# Patient Record
Sex: FEMALE | Race: WHITE | NOT HISPANIC OR LATINO | Employment: UNEMPLOYED | URBAN - METROPOLITAN AREA
[De-identification: names, ages, dates, MRNs, and addresses within clinical notes are randomized per-mention and may not be internally consistent; named-entity substitution may affect disease eponyms.]

---

## 2017-01-17 ENCOUNTER — OFFICE VISIT (OUTPATIENT)
Dept: OBSTETRICS AND GYNECOLOGY | Facility: CLINIC | Age: 37
End: 2017-01-17
Attending: OBSTETRICS & GYNECOLOGY
Payer: COMMERCIAL

## 2017-01-17 ENCOUNTER — LAB VISIT (OUTPATIENT)
Dept: LAB | Facility: OTHER | Age: 37
End: 2017-01-17
Attending: OBSTETRICS & GYNECOLOGY
Payer: COMMERCIAL

## 2017-01-17 VITALS
SYSTOLIC BLOOD PRESSURE: 110 MMHG | WEIGHT: 130.06 LBS | BODY MASS INDEX: 23.04 KG/M2 | HEIGHT: 63 IN | DIASTOLIC BLOOD PRESSURE: 68 MMHG

## 2017-01-17 DIAGNOSIS — N94.6 MENORRHALGIA: ICD-10-CM

## 2017-01-17 DIAGNOSIS — Z01.419 ENCOUNTER FOR GYNECOLOGICAL EXAMINATION: ICD-10-CM

## 2017-01-17 DIAGNOSIS — Z11.51 SCREENING FOR HPV (HUMAN PAPILLOMAVIRUS): ICD-10-CM

## 2017-01-17 DIAGNOSIS — Z12.4 PAP SMEAR FOR CERVICAL CANCER SCREENING: Primary | ICD-10-CM

## 2017-01-17 DIAGNOSIS — N92.6 IRREGULAR PERIODS/MENSTRUAL CYCLES: ICD-10-CM

## 2017-01-17 LAB
BASOPHILS # BLD AUTO: 0.02 K/UL
BASOPHILS NFR BLD: 0.4 %
DIFFERENTIAL METHOD: ABNORMAL
EOSINOPHIL # BLD AUTO: 0.2 K/UL
EOSINOPHIL NFR BLD: 3.9 %
ERYTHROCYTE [DISTWIDTH] IN BLOOD BY AUTOMATED COUNT: 12.7 %
FSH SERPL-ACNC: 4.4 MIU/ML
HCT VFR BLD AUTO: 39.7 %
HGB BLD-MCNC: 13.4 G/DL
LYMPHOCYTES # BLD AUTO: 2 K/UL
LYMPHOCYTES NFR BLD: 39 %
MCH RBC QN AUTO: 29.6 PG
MCHC RBC AUTO-ENTMCNC: 33.8 %
MCV RBC AUTO: 88 FL
MONOCYTES # BLD AUTO: 0.3 K/UL
MONOCYTES NFR BLD: 5.5 %
NEUTROPHILS # BLD AUTO: 2.6 K/UL
NEUTROPHILS NFR BLD: 51.2 %
PLATELET # BLD AUTO: 141 K/UL
PMV BLD AUTO: 11.8 FL
RBC # BLD AUTO: 4.53 M/UL
TSH SERPL DL<=0.005 MIU/L-ACNC: 1.52 UIU/ML
WBC # BLD AUTO: 5.13 K/UL

## 2017-01-17 PROCEDURE — 83001 ASSAY OF GONADOTROPIN (FSH): CPT

## 2017-01-17 PROCEDURE — 88175 CYTOPATH C/V AUTO FLUID REDO: CPT

## 2017-01-17 PROCEDURE — 99999 PR PBB SHADOW E&M-NEW PATIENT-LVL III: CPT | Mod: PBBFAC,,, | Performed by: OBSTETRICS & GYNECOLOGY

## 2017-01-17 PROCEDURE — 85025 COMPLETE CBC W/AUTO DIFF WBC: CPT

## 2017-01-17 PROCEDURE — 36415 COLL VENOUS BLD VENIPUNCTURE: CPT

## 2017-01-17 PROCEDURE — 84443 ASSAY THYROID STIM HORMONE: CPT

## 2017-01-17 PROCEDURE — 99385 PREV VISIT NEW AGE 18-39: CPT | Mod: S$GLB,,, | Performed by: OBSTETRICS & GYNECOLOGY

## 2017-01-17 PROCEDURE — 87624 HPV HI-RISK TYP POOLED RSLT: CPT

## 2017-01-17 NOTE — MR AVS SNAPSHOT
[facility]  [address]  [address]  [facility] [address]  Phone: [phone]  Fax: [phone]                  Rakan Castro   2017 11:00 AM   Office Visit    Description:  Female : 1980   Provider:  Michael Schofield MD   Department:  [facility]           Reason for Visit     Well Woman           Diagnoses this Visit        Comments    Pap smear for cervical cancer screening    -  Primary     Screening for HPV (human papillomavirus)         Encounter for gynecological examination         Menorrhalgia         Irregular periods/menstrual cycles                To Do List           Future Appointments        Provider Department Dept Phone    2017 12:45 PM LAB, [facility] [phone]    2017 9:00 AM [facility], WOMEN'S ULTRASOUND [facility] - Ultrasound [phone]    2017 9:30 AM Michael Schofield MD [facility] [phone]      Goals (5 Years of Data)     None      Follow-Up and Disposition     Return in about 1 year (around 2018).       These Medications        Disp Refills Start End    norethindrone-ethinyl estradiol (OVCON) 0.4-35 mg-mcg per tablet 28 tablet 11 2017    Take 1 tablet by mouth once daily. - Oral    Pharmacy: [facility] - [address] #: [phone]         [facility] On Call     [facility] On Call Nurse Care Line -  Assistance  Registered nurses in the [facility] provide clinical advisement, health education, appointment booking, and other advisory services.  Call for this free service at [phone].             Medications           Message regarding Medications     Verify the changes and/or additions to your medication regime listed below are the same as discussed with your clinician today.  If any of these changes or additions are incorrect, please notify your healthcare provider.        START taking these  "NEW medications        Refills    norethindrone-ethinyl estradiol (OVCON) 0.4-35 mg-mcg per tablet 11    Sig: Take 1 tablet by mouth once daily.    Class: Normal    Route: Oral           Verify that the below list of medications is an accurate representation of the medications you are currently taking.  If none reported, the list may be blank. If incorrect, please contact your healthcare provider. Carry this list with you in case of emergency.           Current Medications     norethindrone-ethinyl estradiol (OVCON) 0.4-35 mg-mcg per tablet Take 1 tablet by mouth once daily.           Clinical Reference Information           Vital Signs - Last Recorded  Most recent update: 1/17/2017 11:37 AM by Liv Bravo MA    BP Ht Wt LMP BMI    110/68 5' 3" (1.6 m) 59 kg (130 lb 1.1 oz) 12/31/2016 23.04 kg/m2      Blood Pressure          Most Recent Value    BP  110/68      Allergies as of 1/17/2017     Sulfa (Sulfonamide Antibiotics)      Immunizations Administered on Date of Encounter - 1/17/2017     None      Orders Placed During Today's Visit      Normal Orders This Visit    HPV DNA probe, amplified     Liquid-based pap smear, screening     Future Labs/Procedures Expected by Expires    CBC auto differential  1/17/2017 3/18/2018    TSH  1/17/2017 3/18/2018    US Pelvis Comp with Transvag NON-OB (xpd  1/17/2017 1/17/2018    Follicle stimulating hormone  As directed 3/18/2018      "

## 2017-01-17 NOTE — PROGRESS NOTES
"CC: Well woman exam    Rakan Castro is a 36 y.o. female  presents for a well woman exam.  She is established.  LMP: Patient's last menstrual period was 2016..      There are no preventive care reminders to display for this patient.  Annual Exam, . Last pap  negative. Last mammo - [facility] neg  Her  had a vasectomy.    Last menstrual period 16.  Cycles are regular lasting 4-5 days.  However for the last 6 months after her period finishes she stops bleeding for 5 days and then she starts up with a whole second.    Past Medical History   Diagnosis Date    Anxiety     Thrombocytopenia        Past Surgical History   Procedure Laterality Date     section       x 3       OB History    Para Term  AB SAB TAB Ectopic Multiple Living   3 3 3       3      # Outcome Date GA Lbr Curry/2nd Weight Sex Delivery Anes PTL Lv   3 Term  38w0d  2.892 kg (6 lb 6 oz) M CS-LTranv   Y   2 Term  38w0d  2.58 kg (5 lb 11 oz) M CS-LTranv   Y   1 Term  39w0d  2.466 kg (5 lb 7 oz) M CS-LTranv   Y      Complications: Oligohydramnios          Family History   Problem Relation Age of Onset    No Known Problems Father     No Known Problems Mother     No Known Problems Brother     Breast cancer Maternal Aunt     Diabetes Neg Hx     Colon cancer Neg Hx        Social History   Substance Use Topics    Smoking status: Never Smoker    Smokeless tobacco: None    Alcohol use Yes       Visit Vitals    /68    Ht 5' 3" (1.6 m)    Wt 59 kg (130 lb 1.1 oz)    LMP 2016    BMI 23.04 kg/m2         ROS:  GENERAL: Denies weight gain or weight loss. Feeling well overall.   SKIN: Denies rash or lesions.   HEAD: Denies head injury or headache.   NODES: Denies enlarged lymph nodes.   CHEST: Denies chest pain or shortness of breath.   CARDIOVASCULAR: Denies palpitations or left sided chest pain.   ABDOMEN: No abdominal pain, constipation, diarrhea, nausea, vomiting or " rectal bleeding.   URINARY: No frequency, dysuria, hematuria, or burning on urination.  REPRODUCTIVE: See HPI.   BREASTS: The patient performs breast self-examination and denies pain, lumps, or nipple discharge.   HEMATOLOGIC: No easy bruisability or excessive bleeding.  MUSCULOSKELETAL: Denies joint pain or swelling.   NEUROLOGIC: Denies syncope or weakness.   PSYCHIATRIC: Denies depression, anxiety or mood swings.    Physical Exam:    APPEARANCE: Well nourished, well developed, in no acute distress.  AFFECT: WNL, alert and oriented x 3  SKIN: No acne or hirsutism  ABDOMEN: Soft.  No tenderness or masses.  No hepatosplenomegaly.  No hernias.  BREASTS: Symmetrical, no skin changes or visible lesions.  No palpable masses, nipple discharge bilaterally.  PELVIC: Normal external genitalia without lesions.  Normal hair distribution.  Adequate perineal body, normal urethral meatus.  Vagina moist and well rugated without lesions or discharge.  Cervix pink, without lesions, discharge or tenderness.  No significant cystocele or rectocele.  Bimanual exam shows uterus to be normal size, regular, mobile and nontender.  Adnexa without masses or tenderness.    EXTREMITIES: No edema.    ASSESSMENT AND PLAN  1. Pap smear for cervical cancer screening  Liquid-based pap smear, screening   2. Screening for HPV (human papillomavirus)  HPV DNA probe, amplified   3. Encounter for gynecological examination     4. Menorrhalgia  Follicle stimulating hormone    TSH    CBC auto differential    US Pelvis Comp with Transvag NON-OB (xpd   5. Irregular periods/menstrual cycles   will order ultrasound for next week.  Patient agreeable to a trial of OCPs.  If OCPs regulate her period she will continue.  If OCPs do not regulate her period and she needs an endometrial biopsy.         Patient was counseled today on A.C.S. Pap guidelines and recommendations for yearly pelvic exams, mammograms and monthly self breast exams; to see her PCP for other  health maintenance.     Return in about 1 year (around 1/17/2018).

## 2017-01-24 ENCOUNTER — TELEPHONE (OUTPATIENT)
Dept: OBSTETRICS AND GYNECOLOGY | Facility: CLINIC | Age: 37
End: 2017-01-24

## 2017-01-24 NOTE — TELEPHONE ENCOUNTER
----- Message from Chin Trivedi sent at 1/24/2017 10:12 AM CST -----  Regarding: send out lab called about pts HPV specimen  Aleksandra from the sendout lab at [facility] called about pt's HPV specimen from 1/17. She said the specimen was QNS.

## 2017-01-25 ENCOUNTER — OFFICE VISIT (OUTPATIENT)
Dept: OBSTETRICS AND GYNECOLOGY | Facility: CLINIC | Age: 37
End: 2017-01-25
Payer: COMMERCIAL

## 2017-01-25 VITALS
DIASTOLIC BLOOD PRESSURE: 70 MMHG | HEIGHT: 63 IN | SYSTOLIC BLOOD PRESSURE: 110 MMHG | WEIGHT: 130 LBS | BODY MASS INDEX: 23.04 KG/M2

## 2017-01-25 DIAGNOSIS — N92.0 MENORRHAGIA WITH REGULAR CYCLE: Primary | ICD-10-CM

## 2017-01-25 DIAGNOSIS — D69.6 DISORDER INVOLVING THROMBOCYTOPENIA: ICD-10-CM

## 2017-01-25 PROCEDURE — 99213 OFFICE O/P EST LOW 20 MIN: CPT | Mod: S$GLB,,, | Performed by: OBSTETRICS & GYNECOLOGY

## 2017-01-25 PROCEDURE — 99999 PR PBB SHADOW E&M-EST. PATIENT-LVL II: CPT | Mod: PBBFAC,,, | Performed by: OBSTETRICS & GYNECOLOGY

## 2017-01-25 PROCEDURE — 1159F MED LIST DOCD IN RCRD: CPT | Mod: S$GLB,,, | Performed by: OBSTETRICS & GYNECOLOGY

## 2017-01-25 NOTE — PROGRESS NOTES
Subjective:       Patient ID: Rakan Castro is a 36 y.o. female.    Chief Complaint: here for f/u menorrhagia, labs and u/s    History of Present Illness  36-year-old  3 para 3 with history of menorrhagia here today for follow-up of her lab work as well as an ultrasound.  CBC TSH and FSH were all normal.  Patient has a known history of thrombocytopenia and her platelets are 141.  Ultrasound today her uterus is 7 x 5 x 6 cm with a thickened endometrial stripe of 1.2 cm however patient is due for her period in the next several days.  Right and left ovaries are completely normal.  Patient was prescribed Ovcon for which she will start when she gets her period      GYN & OB History  Patient's last menstrual period was 2016.   Date of Last Pap: 2017    OB History    Para Term  AB SAB TAB Ectopic Multiple Living   3 3 3       3      # Outcome Date GA Lbr Curry/2nd Weight Sex Delivery Anes PTL Lv   3 Term  38w0d  2.892 kg (6 lb 6 oz) M CS-LTranv   Y   2 Term  38w0d  2.58 kg (5 lb 11 oz) M CS-LTranv   Y   1 Term  39w0d  2.466 kg (5 lb 7 oz) M CS-LTranv   Y      Complications: Oligohydramnios          Review of Systems  Review of Systems   Constitutional: Negative for fatigue and unexpected weight change.   Respiratory: Negative for shortness of breath.    Cardiovascular: Negative for chest pain.   Gastrointestinal: Negative for abdominal pain, constipation, diarrhea, nausea and vomiting.   Genitourinary: Negative for dysuria.   Musculoskeletal: Negative for back pain.   Skin: Negative for rash.   Neurological: Negative for headaches.   Hematological: Does not bruise/bleed easily.   Psychiatric/Behavioral: Negative for behavioral problems.        Objective:   Physical Exam:   Constitutional: She appears well-developed and well-nourished.                           Neurological: She is alert.    Skin: Skin is warm.    Psychiatric: She has a normal mood and affect. Her behavior is  normal.        Assessment/ Plan:          Rakan was seen today for follow-up cervix scan.    Diagnoses and all orders for this visit:    Menorrhagia with regular cycle   Normal labs and ultrasound reviewed with patient today.  Patient has a prescription for Ovcon which she will start with the onset of this next period.  Patient to follow-up with me in 2-3 months and let me know if her cycles have improved.  Disorder involving thrombocytopenia      Return if symptoms worsen or fail to improve.      Health Maintenance       Date Due Completion Date    Lipid Panel 1980 ---    TETANUS VACCINE 2/13/1997 ---    MAMMOGRAM EARLY SCREENING 2/13/1997 ---    Influenza Vaccine 8/1/2016 ---    Pap Smear 1/17/2020 1/17/2017

## 2017-01-25 NOTE — MR AVS SNAPSHOT
"    [facility]  [address]  [place] [address]  Phone: [phone]  Fax: [phone]                  Rakan Castro   2017 9:30 AM   Office Visit    Description:  Female : 1980   Provider:  Michael Schofield MD   Department:  [facility]           Reason for Visit     Follow-up Cervix Scan                To Do List           Goals (5 Years of Data)     None      [facility] On Call     [facility] On Call Nurse Care Line -  Assistance  Registered nurses in the [facility] provide clinical advisement, health education, appointment booking, and other advisory services.  Call for this free service at [phone].             Medications           Message regarding Medications     Verify the changes and/or additions to your medication regime listed below are the same as discussed with your clinician today.  If any of these changes or additions are incorrect, please notify your healthcare provider.             Verify that the below list of medications is an accurate representation of the medications you are currently taking.  If none reported, the list may be blank. If incorrect, please contact your healthcare provider. Carry this list with you in case of emergency.           Current Medications     norethindrone-ethinyl estradiol (OVCON) 0.4-35 mg-mcg per tablet Take 1 tablet by mouth once daily.           Clinical Reference Information           Vital Signs - Last Recorded  Most recent update: 2017  9:56 AM by Shania Sandra MA    BP Ht Wt LMP BMI    110/70 5' 3" (1.6 m) 59 kg (130 lb) 2016 23.03 kg/m2      Blood Pressure          Most Recent Value    BP  110/70      Allergies as of 2017     Sulfa (Sulfonamide Antibiotics)      Immunizations Administered on Date of Encounter - 2017     None      [facility] Sign-Up     Activating your [facility] account is as easy as 1-2-3!     1) Visit [email or web address], select Sign Up Now, enter this " activation code and your date of birth, then select Next.  [email or web address]  Expires: 3/11/2017 10:29 AM      2) Create a username and password to use when you visit [facility] in the future and select a security question in case you lose your password and select Next.    3) Enter your e-mail address and click Sign Up!    Additional Information  If you have questions, please e-mail [email or web address] or call [phone] to talk to our [facility] staff. Remember, [facility] is NOT to be used for urgent needs. For medical emergencies, dial 911.

## 2017-01-29 PROBLEM — N92.0 MENORRHAGIA WITH REGULAR CYCLE: Status: ACTIVE | Noted: 2017-01-29

## 2017-05-04 ENCOUNTER — OFFICE VISIT (OUTPATIENT)
Dept: INTERNAL MEDICINE | Facility: CLINIC | Age: 37
End: 2017-05-04
Payer: COMMERCIAL

## 2017-05-04 VITALS
DIASTOLIC BLOOD PRESSURE: 70 MMHG | BODY MASS INDEX: 22.1 KG/M2 | HEIGHT: 64 IN | SYSTOLIC BLOOD PRESSURE: 96 MMHG | WEIGHT: 129.44 LBS

## 2017-05-04 DIAGNOSIS — R05.9 COUGH: Primary | ICD-10-CM

## 2017-05-04 PROCEDURE — 99203 OFFICE O/P NEW LOW 30 MIN: CPT | Mod: S$GLB,,, | Performed by: INTERNAL MEDICINE

## 2017-05-04 PROCEDURE — 1160F RVW MEDS BY RX/DR IN RCRD: CPT | Mod: S$GLB,,, | Performed by: INTERNAL MEDICINE

## 2017-05-04 PROCEDURE — 99999 PR PBB SHADOW E&M-EST. PATIENT-LVL III: CPT | Mod: PBBFAC,,, | Performed by: INTERNAL MEDICINE

## 2017-05-04 NOTE — PATIENT INSTRUCTIONS
Options of things you can take for an upper respiratory infection:    -acetaminohpen 1000mg three times per day    -naproxen 1-2 tablets, twice daily (or Ibuprofen 1-2 tablets three times daily - these are interchangeable)    -pseudoephedrine, either 30mg every 4 hours as needed or 120mg every 12 hours as needed for nasal congestion (phenylephrine does not work for congestion)    -saline nasal spray or Neti pot as needed for nasal congestion    -cough suppressant containing dextromethorphan (Delsym or Robitussin)    -expectorant containing guaifenesin (Mucinex)    It is safe to take these different medications together.

## 2017-05-04 NOTE — MR AVS SNAPSHOT
"    [facility]  [address]  [place] [address]  Phone: [phone]  Fax: [phone]                  Rakan Csatro   2017 1:00 PM   Office Visit    Description:  Female : 1980   Provider:  Olu Nino MD   Department:  [facility]           Reason for Visit     Cough           Diagnoses this Visit        Comments    Cough    -  Primary            To Do List           Goals (5 Years of Data)     None      [facility] Call     [facility] On Call Nurse Care Line -  Assistance  Unless otherwise directed by your provider, please contact [facility], our nurse care line that is available for  assistance.     Registered nurses in the [facility] provide: appointment scheduling, clinical advisement, health education, and other advisory services.  Call: [phone] (toll free)               Medications           Message regarding Medications     Verify the changes and/or additions to your medication regime listed below are the same as discussed with your clinician today.  If any of these changes or additions are incorrect, please notify your healthcare provider.             Verify that the below list of medications is an accurate representation of the medications you are currently taking.  If none reported, the list may be blank. If incorrect, please contact your healthcare provider. Carry this list with you in case of emergency.           Current Medications     norethindrone-ethinyl estradiol (OVCON) 0.4-35 mg-mcg per tablet Take 1 tablet by mouth once daily.           Clinical Reference Information           Your Vitals Were     BP Height Weight Last Period BMI    96/70 5' 4" (1.626 m) 58.7 kg (129 lb 6.6 oz) 2017 22.21 kg/m2      Blood Pressure          Most Recent Value    BP  96/70      Allergies as of 2017     Bactrim [Sulfamethoxazole-trimethoprim]      Immunizations Administered on Date of Encounter - 2017 "     None      [facility] Sign-Up     Activating your [facility] account is as easy as 1-2-3!     1) Visit [email or web address], select Sign Up Now, enter this activation code and your date of birth, then select Next.  [email or web address]  Expires: 6/18/2017  1:42 PM      2) Create a username and password to use when you visit [facility] in the future and select a security question in case you lose your password and select Next.    3) Enter your e-mail address and click Sign Up!    Additional Information  If you have questions, please e-mail [email or web address] or call [phone] to talk to our [facility] staff. Remember, [facility] is NOT to be used for urgent needs. For medical emergencies, dial 911.         Instructions      Options of things you can take for an upper respiratory infection:    -acetaminohpen 1000mg three times per day    -naproxen 1-2 tablets, twice daily (or Ibuprofen 1-2 tablets three times daily - these are interchangeable)    -pseudoephedrine, either 30mg every 4 hours as needed or 120mg every 12 hours as needed for nasal congestion (phenylephrine does not work for congestion)    -saline nasal spray or Neti pot as needed for nasal congestion    -cough suppressant containing dextromethorphan (Delsym or Robitussin)    -expectorant containing guaifenesin (Mucinex)    It is safe to take these different medications together.           Language Assistance Services     ATTENTION: Language assistance services are available, free of charge. Please call [phone].      ATENCIÓN: Si habla español, tiene a benson disposición servicios gratuitos de asistencia lingüística. Llame al [phone].     CHÚ Ý: N?u b?n nói Ti?ng Vi?t, có các d?ch v? h? tr? ngôn ng? mi?n phí dành cho b?n. G?i s? [phone].         [facility] complies with applicable Federal civil rights laws and does not discriminate on the basis of race, color, national origin, age, disability, or sex.

## 2017-05-04 NOTE — PROGRESS NOTES
Subjective:       Patient ID: Rakan Castro is a 37 y.o. female.    Chief Complaint: Cough    HPI Comments:   New pt.     C/o cough productive of clear phlegm x 1 week.  No fever or SOB.  No wheezing.  She had azithromycin 2 weeks ago from .  She felt she improved for the following week and then worsened again since then.  She did not have any diagnostic tests run at the .  She isn't taking anything for her current sx.  Denies runny nose, sore throat.  She feels a sense of 'deep' nasal congestion (ie posterior, not anterior, as she can still breathe freely through her nostrils).  No allergy sx.      PMH  -2 lifetime episodes of PNA, most recent was 5 yrs ago.          Cough       Review of Systems   Constitutional: Negative.    HENT: Negative.    Eyes: Negative.    Respiratory: Positive for cough.    Cardiovascular: Negative.    Gastrointestinal: Negative.    Genitourinary: Negative.    Musculoskeletal: Negative.    Skin: Negative.    Neurological: Negative.    Psychiatric/Behavioral: Negative.        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       Objective:      "  Vitals:    05/04/17 1317   BP: 96/70   Weight: 58.7 kg (129 lb 6.6 oz)   Height: 5' 4" (1.626 m)     Physical Exam   Constitutional: She is oriented to person, place, and time. She appears well-developed and well-nourished. No distress.   HENT:   Head: Normocephalic and atraumatic.   Right Ear: Tympanic membrane, external ear and ear canal normal.   Left Ear: Tympanic membrane, external ear and ear canal normal.   Mouth/Throat: Uvula is midline, oropharynx is clear and moist and mucous membranes are normal. No oropharyngeal exudate or posterior oropharyngeal erythema.   Eyes: Conjunctivae and EOM are normal. Pupils are equal, round, and reactive to light.   Neck: Normal range of motion. Neck supple.   Cardiovascular: Normal rate, regular rhythm and normal heart sounds.  Exam reveals no gallop and no friction rub.    No murmur heard.  Pulmonary/Chest: Effort normal and breath sounds normal. No respiratory distress. She has no wheezes. She has no rhonchi. She has no rales.   Lymphadenopathy:     She has no cervical adenopathy.   Neurological: She is alert and oriented to person, place, and time.   Skin: She is not diaphoretic.       Assessment:       1. Cough        Plan:           Cough - New URI vs post-infectious.  If URI, presumably viral.  OTC meds PRN.         "

## 2018-03-21 ENCOUNTER — OFFICE VISIT (OUTPATIENT)
Dept: OBSTETRICS AND GYNECOLOGY | Facility: CLINIC | Age: 38
End: 2018-03-21
Attending: OBSTETRICS & GYNECOLOGY
Payer: COMMERCIAL

## 2018-03-21 VITALS
SYSTOLIC BLOOD PRESSURE: 102 MMHG | WEIGHT: 128.63 LBS | HEIGHT: 64 IN | BODY MASS INDEX: 21.96 KG/M2 | DIASTOLIC BLOOD PRESSURE: 72 MMHG

## 2018-03-21 DIAGNOSIS — Z01.419 ENCOUNTER FOR GYNECOLOGICAL EXAMINATION: ICD-10-CM

## 2018-03-21 DIAGNOSIS — Z12.4 ENCOUNTER FOR PAPANICOLAOU SMEAR FOR CERVICAL CANCER SCREENING: Primary | ICD-10-CM

## 2018-03-21 DIAGNOSIS — Z12.39 BREAST CANCER SCREENING: ICD-10-CM

## 2018-03-21 DIAGNOSIS — Z11.51 ENCOUNTER FOR SCREENING FOR HUMAN PAPILLOMAVIRUS (HPV): ICD-10-CM

## 2018-03-21 PROCEDURE — 87624 HPV HI-RISK TYP POOLED RSLT: CPT

## 2018-03-21 PROCEDURE — 88175 CYTOPATH C/V AUTO FLUID REDO: CPT

## 2018-03-21 PROCEDURE — 99395 PREV VISIT EST AGE 18-39: CPT | Mod: S$GLB,,, | Performed by: OBSTETRICS & GYNECOLOGY

## 2018-03-21 PROCEDURE — 99999 PR PBB SHADOW E&M-EST. PATIENT-LVL III: CPT | Mod: PBBFAC,,, | Performed by: OBSTETRICS & GYNECOLOGY

## 2018-03-21 RX ORDER — CHOLECALCIFEROL (VITAMIN D3) 25 MCG
1000 TABLET ORAL DAILY
COMMUNITY
End: 2024-03-21

## 2018-03-21 NOTE — PROGRESS NOTES
"CC: Well woman exam    Rakan Castro is a 38 y.o. female  presents for a well woman exam.  She is established.  LMP: Patient's last menstrual period was 2018..   Last Pap-17-Normal Hpv not found in system.   Last mmg- 12/18/15- [facility]  Contraception vasectomy  Cycles are monthly but slightly variable.  Was seen last year with some midcycle spotting which has now resolved entirely over the past year.  Workup an ultrasound was normal last year.  Did have 1 episode of midcycle spotting since.  Informed patient that is that this is recurring to let me know.  We may want to try progesterone the last half of her cycle as she is not willing to take OCPs.     Health Maintenance   Topic Date Due    Lipid Panel  1979    TETANUS VACCINE  1997    MAMMOGRAM EARLY SCREENING  1997    Influenza Vaccine  2017    Pap Smear with HPV Cotest  2020         Past Medical History:   Diagnosis Date    Anxiety     Thrombocytopenia        Past Surgical History:   Procedure Laterality Date     SECTION      x 3       OB History    Para Term  AB Living   3 3 3     3   SAB TAB Ectopic Multiple Live Births           3      # Outcome Date GA Lbr Curry/2nd Weight Sex Delivery Anes PTL Lv   3 Term  38w0d  2.892 kg (6 lb 6 oz) M CS-LTranv   SONIA   2 Term  38w0d  2.58 kg (5 lb 11 oz) M CS-LTranv   SONIA   1 Term  39w0d  2.466 kg (5 lb 7 oz) M CS-LTranv   SONIA      Complications: Oligohydramnios          Family History   Problem Relation Age of Onset    No Known Problems Father     No Known Problems Mother     No Known Problems Brother     Breast cancer Maternal Aunt     Diabetes Neg Hx     Colon cancer Neg Hx        Social History   Substance Use Topics    Smoking status: Never Smoker    Smokeless tobacco: Never Used    Alcohol use Yes       /72   Ht 5' 4" (1.626 m)   Wt 58.3 kg (128 lb 10.2 oz)   LMP 2018   BMI 22.08 " kg/m²       ROS:  GENERAL: Denies weight gain or weight loss. Feeling well overall.   SKIN: Denies rash or lesions.   HEAD: Denies head injury or headache.   NODES: Denies enlarged lymph nodes.   CHEST: Denies chest pain or shortness of breath.   CARDIOVASCULAR: Denies palpitations or left sided chest pain.   ABDOMEN: No abdominal pain, constipation, diarrhea, nausea, vomiting or rectal bleeding.   URINARY: No frequency, dysuria, hematuria, or burning on urination.      Physical Exam:    APPEARANCE: Well nourished, well developed, in no acute distress.  AFFECT: WNL, alert and oriented x 3  SKIN: No acne or hirsutism  ABDOMEN: Soft.  No tenderness or masses.  No hepatosplenomegaly.  No hernias.  BREASTS: Symmetrical, no skin changes or visible lesions.  No palpable masses, nipple discharge bilaterally.  PELVIC: Normal external genitalia without lesions.  Normal hair distribution.  Adequate perineal body, normal urethral meatus.  Vagina moist and well rugated without lesions or discharge.  Cervix pink, without lesions, discharge or tenderness.  No significant cystocele or rectocele.  Bimanual exam shows uterus to be normal size, regular, mobile and nontender.  Adnexa without masses or tenderness.    EXTREMITIES: No edema.    ASSESSMENT AND PLAN  1. Encounter for Papanicolaou smear for cervical cancer screening  Liquid-based pap smear, screening    HPV High Risk Genotypes, PCR   2. Encounter for screening for human papillomavirus (HPV)  Liquid-based pap smear, screening    HPV High Risk Genotypes, PCR   3. Breast cancer screening  Mammo Digital Screening Bilat with Tomosynthesis CAD    Mammo Digital Screening Bilat with Tomosynthesis CAD   4. Encounter for gynecological examination         Patient was counseled today on A.C.S. Pap guidelines and recommendations for yearly pelvic exams, mammograms and monthly self breast exams; to see her PCP for other health maintenance.     Follow-up in about 1 year (around  3/21/2019).

## 2018-03-26 LAB
HPV16 AG SPEC QL: NEGATIVE
HPV16+18+H RISK 12 DNA CVX-IMP: NEGATIVE
HPV18 DNA SPEC QL NAA+PROBE: NEGATIVE

## 2019-04-03 ENCOUNTER — LAB VISIT (OUTPATIENT)
Dept: LAB | Facility: OTHER | Age: 39
End: 2019-04-03
Attending: OBSTETRICS & GYNECOLOGY
Payer: COMMERCIAL

## 2019-04-03 ENCOUNTER — OFFICE VISIT (OUTPATIENT)
Dept: OBSTETRICS AND GYNECOLOGY | Facility: CLINIC | Age: 39
End: 2019-04-03
Attending: OBSTETRICS & GYNECOLOGY
Payer: COMMERCIAL

## 2019-04-03 VITALS
SYSTOLIC BLOOD PRESSURE: 126 MMHG | BODY MASS INDEX: 22.58 KG/M2 | WEIGHT: 132.25 LBS | HEIGHT: 64 IN | DIASTOLIC BLOOD PRESSURE: 78 MMHG

## 2019-04-03 DIAGNOSIS — Z01.419 ENCOUNTER FOR GYNECOLOGICAL EXAMINATION: Primary | ICD-10-CM

## 2019-04-03 DIAGNOSIS — Z11.51 SCREENING FOR HPV (HUMAN PAPILLOMAVIRUS): ICD-10-CM

## 2019-04-03 DIAGNOSIS — Z12.31 BREAST CANCER SCREENING BY MAMMOGRAM: ICD-10-CM

## 2019-04-03 DIAGNOSIS — Z12.4 ENCOUNTER FOR PAPANICOLAOU SMEAR FOR CERVICAL CANCER SCREENING: ICD-10-CM

## 2019-04-03 DIAGNOSIS — N93.8 DUB (DYSFUNCTIONAL UTERINE BLEEDING): ICD-10-CM

## 2019-04-03 LAB
FSH SERPL-ACNC: 4.2 MIU/ML
PROLACTIN SERPL IA-MCNC: 2.8 NG/ML (ref 5.2–26.5)
T4 FREE SERPL-MCNC: 0.88 NG/DL (ref 0.71–1.51)
TSH SERPL DL<=0.005 MIU/L-ACNC: 0.9 UIU/ML (ref 0.4–4)

## 2019-04-03 PROCEDURE — 88175 CYTOPATH C/V AUTO FLUID REDO: CPT

## 2019-04-03 PROCEDURE — 83001 ASSAY OF GONADOTROPIN (FSH): CPT

## 2019-04-03 PROCEDURE — 88305 TISSUE EXAM BY PATHOLOGIST: CPT | Performed by: PATHOLOGY

## 2019-04-03 PROCEDURE — 36415 COLL VENOUS BLD VENIPUNCTURE: CPT

## 2019-04-03 PROCEDURE — 99999 PR PBB SHADOW E&M-EST. PATIENT-LVL III: CPT | Mod: PBBFAC,,, | Performed by: OBSTETRICS & GYNECOLOGY

## 2019-04-03 PROCEDURE — 58100 BIOPSY OF UTERUS LINING: CPT | Mod: S$GLB,,, | Performed by: OBSTETRICS & GYNECOLOGY

## 2019-04-03 PROCEDURE — 99396 PREV VISIT EST AGE 40-64: CPT | Mod: S$GLB,,, | Performed by: OBSTETRICS & GYNECOLOGY

## 2019-04-03 PROCEDURE — 99999 PR PBB SHADOW E&M-EST. PATIENT-LVL III: ICD-10-PCS | Mod: PBBFAC,,, | Performed by: OBSTETRICS & GYNECOLOGY

## 2019-04-03 PROCEDURE — 84443 ASSAY THYROID STIM HORMONE: CPT

## 2019-04-03 PROCEDURE — 99396 PR PREVENTIVE VISIT,EST,40-64: ICD-10-PCS | Mod: S$GLB,,, | Performed by: OBSTETRICS & GYNECOLOGY

## 2019-04-03 PROCEDURE — 84146 ASSAY OF PROLACTIN: CPT

## 2019-04-03 PROCEDURE — 88305 TISSUE SPECIMEN TO PATHOLOGY, OBSTETRICS/GYNECOLOGY: ICD-10-PCS | Mod: 26,,, | Performed by: PATHOLOGY

## 2019-04-03 PROCEDURE — 58100 PR BIOPSY OF UTERUS LINING: ICD-10-PCS | Mod: S$GLB,,, | Performed by: OBSTETRICS & GYNECOLOGY

## 2019-04-03 PROCEDURE — 84439 ASSAY OF FREE THYROXINE: CPT

## 2019-04-03 PROCEDURE — 88305 TISSUE EXAM BY PATHOLOGIST: CPT | Mod: 26,,, | Performed by: PATHOLOGY

## 2019-04-03 PROCEDURE — 87624 HPV HI-RISK TYP POOLED RSLT: CPT

## 2019-04-03 RX ORDER — ALPRAZOLAM 2 MG/1
TABLET ORAL
COMMUNITY
End: 2022-01-13

## 2019-04-03 NOTE — PROGRESS NOTES
Chief Complaint: well woman exam  Well Woman (Annual Exam, last pap/hpv 3-2018 negative, Last mammo 2018 [facility] per pt. C/o mid-cycle spotting)      Rakan Castro is a 39 y.o. female  presents for a well woman exam.    She is established.  BTB midcycle cycles last 4 days the 1st day is very heavy with association of clots.  The next 3 days and stringy blood.  Patient also seems to be having midcycle spotting which seems to be occurring during ovulation.  Patient has not kept a log but will do so moving forward.    Cycles:  Monthly lasting 4 days heavier than usual.  Associated with some midcycle spotting      LMP: Patient's last menstrual period was 03/15/2019..    Contraception: The current method of family planning is vasectomy.  Last pap: 3/27/2018 normal and hpv neg  Last MM [facility] per pt      Medication List with Changes/Refills   Current Medications    ALPRAZOLAM (XANAX) 2 MG TAB    alprazolam 2 mg tablet    MULTIVITAMIN CAPSULE    Take 1 capsule by mouth once daily.    VITAMIN D 1000 UNITS TAB    Take 1,000 Units by mouth once daily.       Past Medical History:   Diagnosis Date    Anxiety     xanax prn flying    Thrombocytopenia        Past Surgical History:   Procedure Laterality Date     SECTION      x 3       OB History    Para Term  AB Living   3 3 3     3   SAB TAB Ectopic Multiple Live Births           3      # Outcome Date GA Lbr Curry/2nd Weight Sex Delivery Anes PTL Lv   3 Term  38w0d  2.892 kg (6 lb 6 oz) M CS-LTranv   SONIA   2 Term  38w0d  2.58 kg (5 lb 11 oz) M CS-LTranv   SONIA   1 Term  39w0d  2.466 kg (5 lb 7 oz) M CS-LTranv   SONIA      Complications: Oligohydramnios       Family History   Problem Relation Age of Onset    No Known Problems Father     No Known Problems Mother     No Known Problems Brother     Breast cancer Maternal Aunt     Diabetes Neg Hx     Colon cancer Neg Hx        Social History     Tobacco Use    Smoking  "status: Never Smoker    Smokeless tobacco: Never Used   Substance Use Topics    Alcohol use: Yes    Drug use: No         ROS:  GENERAL: Denies weight gain or weight loss. Feeling well overall.   SKIN: Denies rash or lesions.   HEENT: Denies headaches, or vision changes.   CARDIOVASCULAR: Denies palpitations or left sided chest pain.   RESPIRATORY: Denies shortness of breath or dyspnea on exertion.  BREASTS: Denies pain, lumps, or nipple discharge.   ABDOMEN: Denies abdominal pain, constipation, diarrhea, nausea, vomiting, change in appetite or rectal bleeding.   URINARY: Denies frequency, dysuria, hematuria.  NEUROLOGIC: Denies syncope or weakness.   PSYCHIATRIC: Denies depression, anxiety or mood swings.    Physical Exam:  /78   Ht 5' 4" (1.626 m)   Wt 60 kg (132 lb 4.4 oz)   LMP 03/15/2019   BMI 22.71 kg/m²     APPEARANCE: Well nourished, well developed, in no acute distress.  AFFECT: WNL, alert and oriented x 3  SKIN: No acne or hirsutism  BREASTS: Symmetrical, no skin changes.                     No nipple discharge. No palpable masses bilaterally  NODES: No inguinal nor axillary LAD  ABDOMEN: soft Non tender Non distended No masses  PELVIC:   Normal external genitalia without lesions.  Normal hair distribution.   Adequate perineal body, normal urethral meatus. No signif cystocele or rectocele.  Vagina moist and well rugated without lesions or discharge.    Cervix pink, without lesions, discharge or tenderness.  No cervical motion tenderness no cervicitis noted   PAP performed   Bimanual exam shows uterus to be normal size, regular, mobile and nontender.  Adnexa without masses or tenderness.    EXTREMITIES: No edema.    EMB procedure note  After written consent obtained, Speculum placed. Betadine used to clean the cervix. A single tooth tenaculum was placed on the anterior lip of the cervix. Sound 6 cm  EMB pipelle used and moderate tissue obtained. Single tooth removed with excellent hemostasis. " The speculum was removed. The patient tolerated procedure well.       ASSESSMENT AND PLAN    Rakan was seen today for well woman.    Diagnoses and all orders for this visit:    Encounter for gynecological examination    Breast cancer screening by mammogram  -     Mammo Digital Screening Bilat w/ Vega; Future  -     Mammo Digital Screening Bilat w/ Vega    DUB (dysfunctional uterine bleeding)  -     Tissue Specimen To Pathology, Obstetrics/Gynecology  -     US Pelvis Comp with Transvag NON-OB (xpd; Future  -     T4, free; Future  -     TSH; Future  -     Prolactin; Future  -     Follicle stimulating hormone; Future   We discussed at length that her cycles have gotten heavier and is now having some breakthrough bleeding which seems to occur her around the time of ovulation.  Patient will start keep track of her cycle carefully.  We discussed drawn labs to make sure that is not a hormonal issue.  We also will do an ultrasound to rule out any structural issues such as a polyp or a fibroid.  Today I repeated a Pap smear as well as did an endometrial biopsy to ensure that there are no abnormal cells within the cervix nor the uterus.  Patient will follow back up with me in approximately 4 weeks with an ultrasound to review lab and pathology reports.  If ultrasound is normal patient is willing to consider birth control pills or hormonal regulation as she understands that this may be a hormonal issue causing her to have spotting midcycle.    Encounter for Papanicolaou smear for cervical cancer screening  -     Liquid-based pap smear, screening    Screening for HPV (human papillomavirus)  -     HPV High Risk Genotypes, PCR          Follow up in about 1 month (around 5/1/2019) for ultrasound.    Patient was counseled today on A.C.S. Pap guidelines and recommendations for yearly pelvic exams, mammograms and monthly self breast exams; to see her PCP for other health maintenance.     Patient encouraged to register for portal and  results will be sent via portal.       Health Maintenance   Topic Date Due    Lipid Panel  1979    TETANUS VACCINE  02/13/1997    Influenza Vaccine  08/01/2018    Mammogram  02/13/2019    Pap Smear with HPV Cotest  03/21/2021

## 2019-04-08 LAB
HPV HR 12 DNA CVX QL NAA+PROBE: NEGATIVE
HPV16 AG SPEC QL: NEGATIVE
HPV18 DNA SPEC QL NAA+PROBE: NEGATIVE

## 2019-04-13 NOTE — PROGRESS NOTES
Jeremy Bledsoe,    Your pap is normal and your HPV test is negative.  Have a blessed day!    Dr Schofield

## 2019-04-28 NOTE — PROGRESS NOTES
Rakan,  Your uterine biopsy sample was normal. And so was your pap!   Good news!! NO abnormal cells  Keep track of your cycles, get your labs and u/s done and we can review options at your next visit if the irregular bleeding continues.  Take care,   Dr Schofield

## 2019-04-30 ENCOUNTER — TELEPHONE (OUTPATIENT)
Dept: OBSTETRICS AND GYNECOLOGY | Facility: CLINIC | Age: 39
End: 2019-04-30

## 2019-04-30 DIAGNOSIS — Z12.31 BREAST CANCER SCREENING BY MAMMOGRAM: Primary | ICD-10-CM

## 2019-04-30 NOTE — TELEPHONE ENCOUNTER
Dr. Schofield pt called saying that [facility] needs her mmg orders to be faxed over to them. Fax number is [phone].

## 2019-05-01 ENCOUNTER — OFFICE VISIT (OUTPATIENT)
Dept: OBSTETRICS AND GYNECOLOGY | Facility: CLINIC | Age: 39
End: 2019-05-01
Attending: OBSTETRICS & GYNECOLOGY
Payer: COMMERCIAL

## 2019-05-01 VITALS — BODY MASS INDEX: 22.71 KG/M2 | HEIGHT: 64 IN

## 2019-05-01 DIAGNOSIS — N93.8 DUB (DYSFUNCTIONAL UTERINE BLEEDING): Primary | ICD-10-CM

## 2019-05-01 PROCEDURE — 99213 PR OFFICE/OUTPT VISIT, EST, LEVL III, 20-29 MIN: ICD-10-PCS | Mod: S$GLB,,, | Performed by: OBSTETRICS & GYNECOLOGY

## 2019-05-01 PROCEDURE — 3008F PR BODY MASS INDEX (BMI) DOCUMENTED: ICD-10-PCS | Mod: CPTII,S$GLB,, | Performed by: OBSTETRICS & GYNECOLOGY

## 2019-05-01 PROCEDURE — 99213 OFFICE O/P EST LOW 20 MIN: CPT | Mod: S$GLB,,, | Performed by: OBSTETRICS & GYNECOLOGY

## 2019-05-01 PROCEDURE — 3008F BODY MASS INDEX DOCD: CPT | Mod: CPTII,S$GLB,, | Performed by: OBSTETRICS & GYNECOLOGY

## 2019-05-01 PROCEDURE — 99999 PR PBB SHADOW E&M-EST. PATIENT-LVL III: CPT | Mod: PBBFAC,,, | Performed by: OBSTETRICS & GYNECOLOGY

## 2019-05-01 PROCEDURE — 99999 PR PBB SHADOW E&M-EST. PATIENT-LVL III: ICD-10-PCS | Mod: PBBFAC,,, | Performed by: OBSTETRICS & GYNECOLOGY

## 2019-05-01 NOTE — PROGRESS NOTES
Subjective:       Patient ID: Rakan Castro is a 39 y.o. female.    Chief Complaint:  Follow-up (Gyn ultrasound follow-up for mid-cycle spotting)      History of Present Illness  40 y/o here for u/s for  midcycle spotting.  Workup thus so far includes negative EMB and negative Pap smear.  Options discussed and patient would like to try oral contraceptives.  Ultrasound performed today is normal with exception of a 1 cm fluid pocket in the endometrium near the  scar.    HX:  BTB midcycle cycles last 4 days the 1st day is very heavy with association of clots.  The next 3 days and stringy blood.  Patient also seems to be having midcycle spotting which seems to be occurring during ovulation.  Patient has not kept a log but will do so moving forward.   Cycles:  Monthly lasting 4 days heavier than usual.  Associated with some midcycle spotting  Contraception: The current method of family planning is vasectomy.        GYN & OB History  Patient's last menstrual period was 2019.   Date of Last Pap: 2019    OB History    Para Term  AB Living   3 3 3     3   SAB TAB Ectopic Multiple Live Births           3      # Outcome Date GA Lbr Curry/2nd Weight Sex Delivery Anes PTL Lv   3 Term  38w0d  2.892 kg (6 lb 6 oz) M CS-LTranv   SONIA   2 Term  38w0d  2.58 kg (5 lb 11 oz) M CS-LTranv   SONIA   1 Term  39w0d  2.466 kg (5 lb 7 oz) M CS-LTranv   SONIA      Complications: Oligohydramnios       Review of Systems  Review of Systems     Objective:     There were no vitals filed for this visit.    Physical Exam:   Constitutional: She is oriented to person, place, and time. She appears well-developed and well-nourished.    HENT:   Head: Normocephalic.       Pulmonary/Chest: Effort normal.                  Musculoskeletal: Normal range of motion.       Neurological: She is alert and oriented to person, place, and time.    Skin: Skin is warm and dry.    Psychiatric: She has a normal mood and affect.  Her behavior is normal.          Assessment/ Plan:     Orders Placed This Encounter    norgestrel-ethinyl estradiol (LO/OVRAL) 0.3-30 mg-mcg per tablet       Rakan was seen today for follow-up.    Diagnoses and all orders for this visit:    DUB (dysfunctional uterine bleeding)   Pap, EMB, and ultrasound all essentially normal.  -     norgestrel-ethinyl estradiol (LO/OVRAL) 0.3-30 mg-mcg per tablet; Take 1 tablet by mouth once daily.   Patient will try this for 4 months and then will follow up with me via the portal in September and let me know how her cycles are doing and if the breakthrough bleeding has resolved      Follow up in about 1 year (around 5/1/2020).      Health Maintenance       Date Due Completion Date    Lipid Panel 1979 ---    TETANUS VACCINE 02/13/1997 ---    Mammogram 02/13/2019 ---    Influenza Vaccine 08/01/2019 ---    Pap Smear with HPV Cotest 04/03/2022 4/3/2019    Pap Smear 04/03/2022 4/3/2019

## 2019-09-27 NOTE — PROGRESS NOTES
Subjective:       Patient ID: Rakan Castro is a 39 y.o. female with a PMH significant for BRITTON and Thrombocytopenia who presents today to establish care.    Chief Complaint: Hernia    HPI   Patient is overall stable.  Complains of possible hernia for 2 years - feels strain left of umbilicus, especially with exercise.  No other complaints.  Patient denies f/c, n/v/d.  No chest pain or SOB.  No abdominal pain or dysuria.  No headaches or change in vision.  No dizziness.  No significant  weight gain or weight loss.  Remaining ROS negative.    Review of Systems   Constitutional: Negative for activity change, appetite change, chills, diaphoresis, fatigue, fever and unexpected weight change.   HENT: Negative for ear pain, hearing loss, rhinorrhea, sinus pain, tinnitus, trouble swallowing and voice change.    Eyes: Negative for photophobia, pain, discharge and visual disturbance.   Respiratory: Negative for chest tightness, shortness of breath and wheezing.    Cardiovascular: Negative for chest pain, palpitations and leg swelling.   Gastrointestinal: Positive for abdominal pain. Negative for blood in stool, constipation, diarrhea, nausea and vomiting.   Endocrine: Negative for cold intolerance, heat intolerance, polydipsia, polyphagia and polyuria.   Genitourinary: Negative for decreased urine volume, difficulty urinating, dysuria, flank pain, hematuria, menstrual problem, pelvic pain, vaginal bleeding, vaginal discharge and vaginal pain.   Musculoskeletal: Negative for arthralgias, gait problem, joint swelling, myalgias and neck pain.   Skin: Negative for rash.   Neurological: Negative for dizziness, tremors, syncope, weakness, numbness and headaches.   Hematological: Does not bruise/bleed easily.   Psychiatric/Behavioral: Negative for agitation, confusion, dysphoric mood and sleep disturbance. The patient is not nervous/anxious.        Objective:      Physical Exam   Constitutional: She is oriented to person,  place, and time. She appears well-developed and well-nourished. No distress.   HENT:   Head: Normocephalic and atraumatic.   Nose: Nose normal.   Mouth/Throat: Oropharynx is clear and moist.   Eyes: Pupils are equal, round, and reactive to light. Conjunctivae and EOM are normal. No scleral icterus.   Neck: Normal range of motion. Neck supple. No JVD present. No thyromegaly present.   Cardiovascular: Normal rate, regular rhythm and intact distal pulses. Exam reveals no gallop and no friction rub.   No murmur heard.  Pulmonary/Chest: Effort normal and breath sounds normal. No respiratory distress. She has no wheezes. She has no rales.   Abdominal: Soft. Bowel sounds are normal. She exhibits no distension. There is no tenderness. There is no rebound and no guarding.   Musculoskeletal: Normal range of motion. She exhibits no edema.   Lymphadenopathy:     She has no cervical adenopathy.   Neurological: She is alert and oriented to person, place, and time. No cranial nerve deficit or sensory deficit.   Skin: Skin is warm and dry. No rash noted. No erythema.   Psychiatric: She has a normal mood and affect. Her behavior is normal. Thought content normal.       Assessment:       1. Annual physical exam    2. Umbilical pain        Plan:   -Today's Visit - Patient is awake and alert.    -GI - Left Umbilical Pain - Complains of possible hernia for 2 years - feels strain left of umbilicus, especially with exercise.  Has had 3 c-sections in past.  Exam with no palpable hernia.  Will check US.    -Cards - Check Fasting Lipids.     -Endocrine - TSH normal in 4/2019.     -Heme - Thrombocytopenia - Plts 141 in 1/2017.  Repeat CBC.  No bleeding issues.    -GYN - Last seen by GYN on 5/1/2019 for DUB - Uterine Biopsy normal in 4/2019.  On OCP.  Last Pap was negative in 4/2019.      We discussed STD screening - declines.    Last Mammo was negative in 6/2019.        -Psych - Anxiety - on prn Xanax mostly when she flies.  Will refill when  needed.    -HCM - We discussed Flu (today) and Tdap (2014 with last child) vaccinations.      -Follow Up - 12 months

## 2019-10-01 ENCOUNTER — OFFICE VISIT (OUTPATIENT)
Dept: INTERNAL MEDICINE | Facility: CLINIC | Age: 39
End: 2019-10-01
Payer: COMMERCIAL

## 2019-10-01 VITALS
BODY MASS INDEX: 22.65 KG/M2 | HEIGHT: 64 IN | HEART RATE: 87 BPM | DIASTOLIC BLOOD PRESSURE: 77 MMHG | SYSTOLIC BLOOD PRESSURE: 120 MMHG | OXYGEN SATURATION: 100 % | WEIGHT: 132.69 LBS

## 2019-10-01 DIAGNOSIS — R10.33 UMBILICAL PAIN: ICD-10-CM

## 2019-10-01 DIAGNOSIS — Z00.00 ANNUAL PHYSICAL EXAM: Primary | ICD-10-CM

## 2019-10-01 PROCEDURE — 3008F PR BODY MASS INDEX (BMI) DOCUMENTED: ICD-10-PCS | Mod: CPTII,S$GLB,, | Performed by: INTERNAL MEDICINE

## 2019-10-01 PROCEDURE — 99999 PR PBB SHADOW E&M-EST. PATIENT-LVL III: ICD-10-PCS | Mod: PBBFAC,,, | Performed by: INTERNAL MEDICINE

## 2019-10-01 PROCEDURE — 99214 PR OFFICE/OUTPT VISIT, EST, LEVL IV, 30-39 MIN: ICD-10-PCS | Mod: S$GLB,,, | Performed by: INTERNAL MEDICINE

## 2019-10-01 PROCEDURE — 99999 PR PBB SHADOW E&M-EST. PATIENT-LVL III: CPT | Mod: PBBFAC,,, | Performed by: INTERNAL MEDICINE

## 2019-10-01 PROCEDURE — 3008F BODY MASS INDEX DOCD: CPT | Mod: CPTII,S$GLB,, | Performed by: INTERNAL MEDICINE

## 2019-10-01 PROCEDURE — 99214 OFFICE O/P EST MOD 30 MIN: CPT | Mod: S$GLB,,, | Performed by: INTERNAL MEDICINE

## 2019-10-01 NOTE — PATIENT INSTRUCTIONS
Your exam was overall normal today.  I will order an Ultrasound of your abdomin given you left-sided umbilical pain.    Your blood pressure was good.    I will order routine fasting labs today - at least 9 hours of fasting.    We will give you the Flu Vaccine today at [facility].  Get your records on you last tetanus vaccine.    Return in 12 months - sooner if needed.  Please come at least 15-20 minutes before your scheduled appointment time.

## 2019-10-04 ENCOUNTER — PATIENT MESSAGE (OUTPATIENT)
Dept: PRIMARY CARE CLINIC | Facility: CLINIC | Age: 39
End: 2019-10-04

## 2019-10-04 ENCOUNTER — HOSPITAL ENCOUNTER (OUTPATIENT)
Dept: RADIOLOGY | Facility: OTHER | Age: 39
Discharge: HOME | End: 2019-10-04
Attending: INTERNAL MEDICINE
Payer: COMMERCIAL

## 2019-10-04 DIAGNOSIS — R10.33 UMBILICAL PAIN: ICD-10-CM

## 2019-10-04 PROCEDURE — 76705 US ABDOMEN LIMITED_HERNIA: ICD-10-PCS | Mod: 26,,, | Performed by: RADIOLOGY

## 2019-10-04 PROCEDURE — 76705 ECHO EXAM OF ABDOMEN: CPT | Mod: 26,,, | Performed by: RADIOLOGY

## 2019-10-04 PROCEDURE — 76705 ECHO EXAM OF ABDOMEN: CPT | Mod: TC

## 2019-10-05 ENCOUNTER — PATIENT MESSAGE (OUTPATIENT)
Dept: PRIMARY CARE CLINIC | Facility: CLINIC | Age: 39
End: 2019-10-05

## 2020-09-25 ENCOUNTER — OFFICE VISIT (OUTPATIENT)
Dept: OBSTETRICS AND GYNECOLOGY | Facility: CLINIC | Age: 40
End: 2020-09-25
Attending: OBSTETRICS & GYNECOLOGY
Payer: COMMERCIAL

## 2020-09-25 VITALS
WEIGHT: 125.69 LBS | BODY MASS INDEX: 21.46 KG/M2 | SYSTOLIC BLOOD PRESSURE: 122 MMHG | DIASTOLIC BLOOD PRESSURE: 70 MMHG | HEIGHT: 64 IN

## 2020-09-25 DIAGNOSIS — Z01.419 ENCOUNTER FOR GYNECOLOGICAL EXAMINATION: Primary | ICD-10-CM

## 2020-09-25 DIAGNOSIS — N92.3 SPOTTING BETWEEN MENSES: ICD-10-CM

## 2020-09-25 DIAGNOSIS — Z12.31 BREAST CANCER SCREENING BY MAMMOGRAM: ICD-10-CM

## 2020-09-25 PROCEDURE — 99999 PR PBB SHADOW E&M-EST. PATIENT-LVL III: CPT | Mod: PBBFAC,,, | Performed by: OBSTETRICS & GYNECOLOGY

## 2020-09-25 PROCEDURE — 99999 PR PBB SHADOW E&M-EST. PATIENT-LVL III: ICD-10-PCS | Mod: PBBFAC,,, | Performed by: OBSTETRICS & GYNECOLOGY

## 2020-09-25 PROCEDURE — 99396 PREV VISIT EST AGE 40-64: CPT | Mod: S$GLB,,, | Performed by: OBSTETRICS & GYNECOLOGY

## 2020-09-25 PROCEDURE — 99396 PR PREVENTIVE VISIT,EST,40-64: ICD-10-PCS | Mod: S$GLB,,, | Performed by: OBSTETRICS & GYNECOLOGY

## 2020-09-25 PROCEDURE — 3008F PR BODY MASS INDEX (BMI) DOCUMENTED: ICD-10-PCS | Mod: CPTII,S$GLB,, | Performed by: OBSTETRICS & GYNECOLOGY

## 2020-09-25 PROCEDURE — 3008F BODY MASS INDEX DOCD: CPT | Mod: CPTII,S$GLB,, | Performed by: OBSTETRICS & GYNECOLOGY

## 2020-09-25 NOTE — PROGRESS NOTES
Chief Complaint: well woman exam  Well Woman (Annual Exam)    She is established    Rakan Castro is a 40 y.o. female  presents for a well woman exam.    No c/o   Still with midcycle spotting.  Workup thus so far includes negative u/s, EMB and negative Pap smear. Last year was put on OCP but did not take them  Options discussed declined oral contraceptives. Wants to try DIM    U/s last year  FINDINGS:  Uterus:   Size: 7.8 x 4.5 x 5.6 cm   Appearance: Retroverted.  No masses.   Endometrium: Not thickened in this pre menopausal patient, measuring 8 mm.  Small volume of endocervical fluid.   Right ovary:   Size: 2.5 x 1.7 x 2.6 cm   Appearance: Multiple small follicles   Vascular flow: Normal.   Left ovary:   Size: 3.8 x 2.3 x 2.7 cm   Appearance: Multiple small follicles   Vascular Flow: Normal.     IMPRESSION:    Small volume of endocervical fluid.   Uterus appears within normal limits.   Multiple small follicles noted in both ovaries.  Appearance thought likely within normal limits, but clinical correlation suggested.     ROS:  No abdominal pain. No discharge  No breast pain or masses, No rectal bleeding     Cycles:  regular and monthly  LMP: Patient's last menstrual period was 2020..    Contraception: The current method of family planning is vasectomy  Meds per MD: none     Last Pap: 4-3-2019 pap & hpv negative  Last MM2019 [facility]       Past Medical History:   Diagnosis Date    Anxiety     xanax prn flying    Thrombocytopenia        Past Surgical History:   Procedure Laterality Date     SECTION      x 3       OB History    Para Term  AB Living   3 3 3     3   SAB TAB Ectopic Multiple Live Births           3      # Outcome Date GA Lbr Curry/2nd Weight Sex Delivery Anes PTL Lv   3 Term  38w0d  2.892 kg (6 lb 6 oz) M CS-LTranv   SONIA   2 Term  38w0d  2.58 kg (5 lb 11 oz) M CS-LTranv   SONIA   1 Term  39w0d  2.466 kg (5 lb 7 oz) M CS-LTranv   SONIA       "Complications: Oligohydramnios       Family History   Problem Relation Age of Onset    No Known Problems Father     No Known Problems Mother     No Known Problems Brother     Breast cancer Maternal Aunt     No Known Problems Son     No Known Problems Brother     No Known Problems Brother     Diabetes Neg Hx     Colon cancer Neg Hx        Social History     Tobacco Use    Smoking status: Never Smoker    Smokeless tobacco: Never Used   Substance Use Topics    Alcohol use: Yes     Frequency: 2-3 times a week     Drinks per session: 3 or 4     Binge frequency: Never     Comment: 2-3 days per week    Drug use: No         ROS:  GENERAL: Denies weight gain or weight loss. Feeling well overall.   SKIN: Denies rash or lesions.   HEENT: Denies headaches, or vision changes.   CARDIOVASCULAR: Denies palpitations or left sided chest pain.   RESPIRATORY: Denies shortness of breath or dyspnea on exertion.  BREASTS: Denies pain, lumps, or nipple discharge.   ABDOMEN: Denies abdominal pain, constipation, diarrhea, nausea, vomiting, change in appetite or rectal bleeding.   URINARY: Denies frequency, dysuria, hematuria.  NEUROLOGIC: Denies syncope or weakness.   PSYCHIATRIC: Denies depression, anxiety or mood swings.    Physical Exam:  /70   Ht 5' 4" (1.626 m)   Wt 57 kg (125 lb 10.6 oz)   LMP 09/19/2020   BMI 21.57 kg/m²     APPEARANCE: Well nourished, well developed, in no acute distress.  AFFECT: WNL, alert and oriented x 3  SKIN: No acne or hirsutism  BREASTS: Symmetrical, no skin changes.                      No nipple discharge.   No palpable masses bilaterally  NODES: No inguinal nor axillary LAD  ABDOMEN: soft Non tender Non distended No masses  PELVIC:   Normal external genitalia without lesions.  Normal hair distribution.   Adequate perineal body, normal urethral meatus.   No signif cystocele or rectocele.  Vagina moist and well rugated without lesions or discharge.    Cervix pink, without lesions, " discharge or tenderness.     PAP not performed   Bimanual exam shows uterus to be normal size, regular, mobile and nontender.    Adnexa without masses or tenderness.    EXTREMITIES: No edema.        ASSESSMENT AND PLAN  Rakan was seen today for well woman.    Diagnoses and all orders for this visit:    Encounter for gynecological examination    Breast cancer screening by mammogram  -     Mammo Digital Screening Bilat w/ Vega; Future  -     Mammo Digital Screening Bilat w/ Vega    Spotting between menses    Still with midcycle spotting.  Workup thus so far includes negative u/s, EMB and negative Pap smear. Last year was put on OCP but did not take them  Options discussed declined oral contraceptives. Wants to try DIM    Patient was counseled today on A.C.S. Pap guidelines and recommendations for yearly pelvic exams, mammograms and monthly self breast exams; to see her PCP for other health maintenance.     Patient encouraged to register for portal and results will be sent via portal.       Health Maintenance   Topic Date Due    Hepatitis C Screening  1979    TETANUS VACCINE  02/13/1997    Mammogram  06/18/2021    Lipid Panel  Completed

## 2020-09-25 NOTE — PROGRESS NOTES
LMP: Patient's last menstrual period was 2020..    Contraception: The current method of family planning is vasectomy  Meds per MD: none    Last Pap: 4-3-2019 pap & hpv negative  Last MM2019 [facility]

## 2020-11-28 ENCOUNTER — OFFICE VISIT (OUTPATIENT)
Dept: URGENT CARE | Facility: CLINIC | Age: 40
End: 2020-11-28
Payer: COMMERCIAL

## 2020-11-28 VITALS
WEIGHT: 125 LBS | HEIGHT: 64 IN | RESPIRATION RATE: 18 BRPM | BODY MASS INDEX: 21.34 KG/M2 | HEART RATE: 77 BPM | OXYGEN SATURATION: 98 % | SYSTOLIC BLOOD PRESSURE: 113 MMHG | TEMPERATURE: 98 F | DIASTOLIC BLOOD PRESSURE: 85 MMHG

## 2020-11-28 DIAGNOSIS — U07.1 COVID-19: Primary | ICD-10-CM

## 2020-11-28 DIAGNOSIS — Z20.822 EXPOSURE TO COVID-19 VIRUS: ICD-10-CM

## 2020-11-28 DIAGNOSIS — R05.9 COUGH: ICD-10-CM

## 2020-11-28 LAB
CTP QC/QA: YES
SARS-COV-2 RDRP RESP QL NAA+PROBE: POSITIVE

## 2020-11-28 PROCEDURE — 99201 PR OFFICE/OUTPT VISIT,NEW,LEVL I: ICD-10-PCS | Mod: S$GLB,,, | Performed by: NURSE PRACTITIONER

## 2020-11-28 PROCEDURE — 3008F PR BODY MASS INDEX (BMI) DOCUMENTED: ICD-10-PCS | Mod: CPTII,S$GLB,, | Performed by: NURSE PRACTITIONER

## 2020-11-28 PROCEDURE — U0002 COVID-19 LAB TEST NON-CDC: HCPCS | Mod: QW,S$GLB,, | Performed by: NURSE PRACTITIONER

## 2020-11-28 PROCEDURE — 3008F BODY MASS INDEX DOCD: CPT | Mod: CPTII,S$GLB,, | Performed by: NURSE PRACTITIONER

## 2020-11-28 PROCEDURE — U0002: ICD-10-PCS | Mod: QW,S$GLB,, | Performed by: NURSE PRACTITIONER

## 2020-11-28 PROCEDURE — 99201 PR OFFICE/OUTPT VISIT,NEW,LEVL I: CPT | Mod: S$GLB,,, | Performed by: NURSE PRACTITIONER

## 2020-11-28 NOTE — PROGRESS NOTES
"Subjective:       Patient ID: Rakan Castro is a 40 y.o. female.    Vitals:  height is 5' 4" (1.626 m) and weight is 56.7 kg (125 lb). Her temperature is 97.6 °F (36.4 °C). Her blood pressure is 113/85 and her pulse is 77. Her respiration is 18 and oxygen saturation is 98%.     Chief Complaint: Cough     Pt states cough since yesterday. Pt.'s  tested positive for covid this am.    Cough  This is a new problem. The current episode started yesterday. The problem has been gradually worsening. The cough is productive of sputum. Associated symptoms include headaches. Pertinent negatives include no chest pain, chills, fever, myalgias, rash, sore throat or shortness of breath. Treatments tried: robitussin. The treatment provided mild relief. Her past medical history is significant for bronchitis and pneumonia.       Constitution: Negative for chills, fatigue and fever.   HENT: Negative for congestion and sore throat.    Neck: Negative for painful lymph nodes.   Cardiovascular: Negative for chest pain and leg swelling.   Eyes: Negative for double vision and blurred vision.   Respiratory: Positive for cough. Negative for shortness of breath.    Gastrointestinal: Negative for nausea, vomiting and diarrhea.   Genitourinary: Negative for dysuria, frequency, urgency and history of kidney stones.   Musculoskeletal: Negative for joint pain, joint swelling, muscle cramps and muscle ache.   Skin: Negative for color change, pale, rash and bruising.   Allergic/Immunologic: Negative for seasonal allergies.   Neurological: Positive for headaches. Negative for dizziness, history of vertigo, light-headedness and passing out.   Hematologic/Lymphatic: Negative for swollen lymph nodes.   Psychiatric/Behavioral: Negative for nervous/anxious, sleep disturbance and depression. The patient is not nervous/anxious.        Objective:      Physical Exam   Constitutional: She is oriented to person, place, and time.   HENT:   Head: " Normocephalic and atraumatic.   Cardiovascular: Normal rate.   Pulmonary/Chest: Effort normal. No respiratory distress.   Abdominal: Normal appearance.   Neurological: She is alert and oriented to person, place, and time.   Skin: Skin is warm and dry. Psychiatric: Her behavior is normal. Mood normal.         Results for orders placed or performed in visit on 11/28/20   POCT COVID-19 Rapid Screening   Result Value Ref Range    POC Rapid COVID Positive (A) Negative     Acceptable Yes      Assessment:       1. COVID-19    2. Cough    3. Exposure to COVID-19 virus        Plan:       Patient Instructions   You have tested positive for COVID-19 today.  Per the CDC, you are now in a 10 day isolation.    This isolation starts from the day you first developed symptoms, not the day of your positive test. For example, if your symptoms began on a Monday, and you waited until Friday of the same week to get tested, and it was positive, your 10 day isolation begins from that Monday, not the Friday you tested positive.    However, if you are asymptomatic (a person who does not have any symptoms), and received a COVID-19 test that was positive, your 10 day isolation begins on the day you tested positive.  This is regardless if you were exposed to a known positive days earlier.  So for example, if you test positive as an asymptomatic on day 7 from exposure, you have now extended your 14 day quarantine to a 17 day quarantine.    Also, per the CDC guidelines, once your 10 days have passed, and you have not had fever greater than 100.4F in the last 24 hours without taking any fever reducers such as Tylenol (Acetaminophen) or Motrin (Ibuprofen), you may return to your normal activities including social distancing, wearing masks, and frequent handwashing - YOU DO NOT NEED ANOTHER TEST, OR TO TEST NEGATIVE, IN ORDER TO END YOUR QUARANTINE!        COVID-19    Cough  -     POCT COVID-19 Rapid Screening    Exposure to COVID-19  virus

## 2020-11-30 RX ORDER — AZITHROMYCIN 250 MG/1
TABLET, FILM COATED ORAL
Qty: 6 TABLET | Refills: 0 | Status: SHIPPED | OUTPATIENT
Start: 2020-11-30 | End: 2020-12-05

## 2020-11-30 RX ORDER — ONDANSETRON 4 MG/1
4 TABLET, ORALLY DISINTEGRATING ORAL EVERY 6 HOURS PRN
Qty: 21 TABLET | Refills: 0 | Status: SHIPPED | OUTPATIENT
Start: 2020-11-30 | End: 2022-01-13

## 2020-12-15 RX ORDER — DOXYCYCLINE HYCLATE 100 MG
100 TABLET ORAL EVERY 12 HOURS
Qty: 14 TABLET | Refills: 0 | Status: SHIPPED | OUTPATIENT
Start: 2020-12-15 | End: 2020-12-22

## 2020-12-15 RX ORDER — PREDNISONE 20 MG/1
40 TABLET ORAL DAILY
Qty: 10 TABLET | Refills: 0 | Status: SHIPPED | OUTPATIENT
Start: 2020-12-15 | End: 2020-12-20

## 2020-12-17 ENCOUNTER — OFFICE VISIT (OUTPATIENT)
Dept: INTERNAL MEDICINE | Facility: CLINIC | Age: 40
End: 2020-12-17
Payer: COMMERCIAL

## 2020-12-17 VITALS
TEMPERATURE: 99 F | HEART RATE: 79 BPM | SYSTOLIC BLOOD PRESSURE: 124 MMHG | WEIGHT: 124.69 LBS | BODY MASS INDEX: 21.4 KG/M2 | DIASTOLIC BLOOD PRESSURE: 85 MMHG

## 2020-12-17 DIAGNOSIS — R53.83 FATIGUE, UNSPECIFIED TYPE: ICD-10-CM

## 2020-12-17 DIAGNOSIS — E78.1 HYPERTRIGLYCERIDEMIA: ICD-10-CM

## 2020-12-17 DIAGNOSIS — R05.8 POST-VIRAL COUGH SYNDROME: ICD-10-CM

## 2020-12-17 DIAGNOSIS — B96.89 ACUTE BACTERIAL SINUSITIS: Primary | ICD-10-CM

## 2020-12-17 DIAGNOSIS — J01.90 ACUTE BACTERIAL SINUSITIS: Primary | ICD-10-CM

## 2020-12-17 PROCEDURE — 3008F PR BODY MASS INDEX (BMI) DOCUMENTED: ICD-10-PCS | Mod: CPTII,S$GLB,, | Performed by: INTERNAL MEDICINE

## 2020-12-17 PROCEDURE — 3008F BODY MASS INDEX DOCD: CPT | Mod: CPTII,S$GLB,, | Performed by: INTERNAL MEDICINE

## 2020-12-17 PROCEDURE — 1126F AMNT PAIN NOTED NONE PRSNT: CPT | Mod: S$GLB,,, | Performed by: INTERNAL MEDICINE

## 2020-12-17 PROCEDURE — 99999 PR PBB SHADOW E&M-EST. PATIENT-LVL III: ICD-10-PCS | Mod: PBBFAC,,, | Performed by: INTERNAL MEDICINE

## 2020-12-17 PROCEDURE — 99999 PR PBB SHADOW E&M-EST. PATIENT-LVL III: CPT | Mod: PBBFAC,,, | Performed by: INTERNAL MEDICINE

## 2020-12-17 PROCEDURE — 1126F PR PAIN SEVERITY QUANTIFIED, NO PAIN PRESENT: ICD-10-PCS | Mod: S$GLB,,, | Performed by: INTERNAL MEDICINE

## 2020-12-17 PROCEDURE — 99213 PR OFFICE/OUTPT VISIT, EST, LEVL III, 20-29 MIN: ICD-10-PCS | Mod: S$GLB,,, | Performed by: INTERNAL MEDICINE

## 2020-12-17 PROCEDURE — 99213 OFFICE O/P EST LOW 20 MIN: CPT | Mod: S$GLB,,, | Performed by: INTERNAL MEDICINE

## 2020-12-17 RX ORDER — GUAIFENESIN AND DEXTROMETHORPHAN HYDROBROMIDE 600; 30 MG/1; MG/1
1 TABLET, EXTENDED RELEASE ORAL 2 TIMES DAILY
Qty: 20 TABLET | Refills: 0 | Status: SHIPPED | OUTPATIENT
Start: 2020-12-17 | End: 2020-12-27

## 2020-12-17 RX ORDER — FLUTICASONE PROPIONATE 50 MCG
1 SPRAY, SUSPENSION (ML) NASAL DAILY
Qty: 16 G | Refills: 0 | Status: SHIPPED | OUTPATIENT
Start: 2020-12-17 | End: 2021-01-16

## 2020-12-17 NOTE — PATIENT INSTRUCTIONS

## 2020-12-17 NOTE — PROGRESS NOTES
Subjective:       Patient ID: Rakan Castro is a 40 y.o. female.    Chief Complaint: Cough and Chest Congestion    40-year-old nonsmoking female who is here to establish with a primary care doctor but also for complaints of cough and congestion since having COVID infections almost 3 weeks ago.  After about 5 days of fever, chills, cough and fatigue her symptoms did improve.  The patient does have a history of having walking pneumonia as well as bronchitis as a child.  She does not have asthma however or allergic rhinitis.  The cough is both atypical and postnasal drip but also she appreciated in her upper chest.  We did start her on doxycycline 2 days ago with continuation of Mucinex DM and Flonase and her symptoms are improved today.  The cough is productive of clear mucus.  No fever or chills.  No pleuritic chest pain.  No shortness of breath.    Review of Systems   Constitutional: Negative for activity change, appetite change, chills, diaphoresis, fatigue, fever and unexpected weight change.   HENT: Positive for postnasal drip and sinus pressure/congestion. Negative for nasal congestion, ear pain, mouth sores, sore throat and trouble swallowing.    Eyes: Negative for pain, redness and visual disturbance.   Respiratory: Positive for cough. Negative for apnea, chest tightness, shortness of breath and wheezing.    Cardiovascular: Negative for chest pain, palpitations and leg swelling.   Gastrointestinal: Negative for abdominal distention, abdominal pain, blood in stool, constipation, diarrhea, nausea and vomiting.   Endocrine: Negative for cold intolerance, polydipsia, polyphagia and polyuria.   Genitourinary: Negative for difficulty urinating, dysuria, flank pain, frequency, hematuria, menstrual problem, pelvic pain and urgency.   Musculoskeletal: Negative for arthralgias, back pain, joint swelling and neck pain.   Integumentary:  Negative for color change, rash and wound.   Neurological: Negative for  dizziness, tremors, seizures, syncope, weakness, light-headedness, numbness and headaches.   Hematological: Negative for adenopathy. Does not bruise/bleed easily.   Psychiatric/Behavioral: Negative for confusion, decreased concentration, dysphoric mood, hallucinations, self-injury, sleep disturbance and suicidal ideas. The patient is not nervous/anxious.          Objective:      Physical Exam  Constitutional:       General: She is not in acute distress.     Appearance: She is well-developed. She is not diaphoretic.   HENT:      Head: Normocephalic and atraumatic.      Right Ear: Tympanic membrane, ear canal and external ear normal. No drainage. Tympanic membrane is not scarred or retracted.      Left Ear: Tympanic membrane, ear canal and external ear normal. No drainage. Tympanic membrane is not scarred or retracted.      Nose: Mucosal edema present.      Right Sinus: No maxillary sinus tenderness or frontal sinus tenderness.      Left Sinus: No maxillary sinus tenderness or frontal sinus tenderness.      Mouth/Throat:      Pharynx: Posterior oropharyngeal erythema present. No oropharyngeal exudate or uvula swelling.      Tonsils: No tonsillar exudate.      Comments: Cobblestoning and clears postnasal drip visualized.  Eyes:      General:         Right eye: No discharge.         Left eye: No discharge.      Conjunctiva/sclera: Conjunctivae normal.   Neck:      Musculoskeletal: Normal range of motion and neck supple.   Cardiovascular:      Rate and Rhythm: Normal rate and regular rhythm.   Pulmonary:      Effort: Pulmonary effort is normal. No respiratory distress.      Breath sounds: Normal breath sounds. No stridor. No wheezing or rales.   Chest:      Chest wall: No tenderness.   Lymphadenopathy:      Head:      Right side of head: No submandibular, preauricular or posterior auricular adenopathy.      Left side of head: No submandibular, preauricular or posterior auricular adenopathy.      Cervical: No cervical  adenopathy.   Skin:     General: Skin is warm and dry.   Psychiatric:         Behavior: Behavior normal.         Assessment:       1. Acute bacterial sinusitis    2. Post-viral cough syndrome    3. Hypertriglyceridemia    4. Fatigue, unspecified type        Plan:       Acute bacterial sinusitis  -     fluticasone propionate (FLONASE) 50 mcg/actuation nasal spray; 1 spray (50 mcg total) by Each Nostril route once daily.  Dispense: 16 g; Refill: 0  -     dextromethorphan-guaifenesin  mg (MUCINEX DM)  mg per 12 hr tablet; Take 1 tablet by mouth 2 (two) times daily. for 10 days  Dispense: 20 tablet; Refill: 0   Finish course of Doxycyline      Post-viral cough syndrome   Noted and as per above    Hypertriglyceridemia  -     Lipid Panel; Future; Expected date: 02/01/2021    Fatigue, unspecified type  -     CBC Auto Differential; Future; Expected date: 02/01/2021  -     Comprehensive Metabolic Panel; Future; Expected date: 02/01/2021

## 2021-02-03 ENCOUNTER — LAB VISIT (OUTPATIENT)
Dept: LAB | Facility: OTHER | Age: 41
End: 2021-02-03
Attending: INTERNAL MEDICINE
Payer: COMMERCIAL

## 2021-02-03 DIAGNOSIS — R53.83 FATIGUE, UNSPECIFIED TYPE: ICD-10-CM

## 2021-02-03 DIAGNOSIS — E78.1 HYPERTRIGLYCERIDEMIA: ICD-10-CM

## 2021-02-03 LAB
ALBUMIN SERPL BCP-MCNC: 4.3 G/DL (ref 3.5–5.2)
ALP SERPL-CCNC: 35 U/L (ref 55–135)
ALT SERPL W/O P-5'-P-CCNC: 13 U/L (ref 10–44)
ANION GAP SERPL CALC-SCNC: 9 MMOL/L (ref 8–16)
AST SERPL-CCNC: 18 U/L (ref 10–40)
BASOPHILS # BLD AUTO: 0.03 K/UL (ref 0–0.2)
BASOPHILS NFR BLD: 0.8 % (ref 0–1.9)
BILIRUB SERPL-MCNC: 0.5 MG/DL (ref 0.1–1)
BUN SERPL-MCNC: 17 MG/DL (ref 6–20)
CALCIUM SERPL-MCNC: 9.3 MG/DL (ref 8.7–10.5)
CHLORIDE SERPL-SCNC: 104 MMOL/L (ref 95–110)
CO2 SERPL-SCNC: 27 MMOL/L (ref 23–29)
CREAT SERPL-MCNC: 0.7 MG/DL (ref 0.5–1.4)
DIFFERENTIAL METHOD: ABNORMAL
EOSINOPHIL # BLD AUTO: 0.2 K/UL (ref 0–0.5)
EOSINOPHIL NFR BLD: 4.8 % (ref 0–8)
ERYTHROCYTE [DISTWIDTH] IN BLOOD BY AUTOMATED COUNT: 12.3 % (ref 11.5–14.5)
EST. GFR  (AFRICAN AMERICAN): >60 ML/MIN/1.73 M^2
EST. GFR  (NON AFRICAN AMERICAN): >60 ML/MIN/1.73 M^2
GLUCOSE SERPL-MCNC: 82 MG/DL (ref 70–110)
HCT VFR BLD AUTO: 39.1 % (ref 37–48.5)
HGB BLD-MCNC: 13.1 G/DL (ref 12–16)
IMM GRANULOCYTES # BLD AUTO: 0.01 K/UL (ref 0–0.04)
IMM GRANULOCYTES NFR BLD AUTO: 0.3 % (ref 0–0.5)
LYMPHOCYTES # BLD AUTO: 1.4 K/UL (ref 1–4.8)
LYMPHOCYTES NFR BLD: 38.5 % (ref 18–48)
MCH RBC QN AUTO: 29.8 PG (ref 27–31)
MCHC RBC AUTO-ENTMCNC: 33.5 G/DL (ref 32–36)
MCV RBC AUTO: 89 FL (ref 82–98)
MONOCYTES # BLD AUTO: 0.2 K/UL (ref 0.3–1)
MONOCYTES NFR BLD: 5.9 % (ref 4–15)
NEUTROPHILS # BLD AUTO: 1.8 K/UL (ref 1.8–7.7)
NEUTROPHILS NFR BLD: 49.7 % (ref 38–73)
NRBC BLD-RTO: 0 /100 WBC
PLATELET # BLD AUTO: 151 K/UL (ref 150–350)
PMV BLD AUTO: 10.9 FL (ref 9.2–12.9)
POTASSIUM SERPL-SCNC: 3.9 MMOL/L (ref 3.5–5.1)
PROT SERPL-MCNC: 7 G/DL (ref 6–8.4)
RBC # BLD AUTO: 4.39 M/UL (ref 4–5.4)
SODIUM SERPL-SCNC: 140 MMOL/L (ref 136–145)
WBC # BLD AUTO: 3.56 K/UL (ref 3.9–12.7)

## 2021-02-03 PROCEDURE — 36415 COLL VENOUS BLD VENIPUNCTURE: CPT

## 2021-02-03 PROCEDURE — 85025 COMPLETE CBC W/AUTO DIFF WBC: CPT

## 2021-02-03 PROCEDURE — 80061 LIPID PANEL: CPT

## 2021-02-03 PROCEDURE — 80053 COMPREHEN METABOLIC PANEL: CPT

## 2021-02-04 LAB
CHOLEST SERPL-MCNC: 175 MG/DL (ref 120–199)
CHOLEST/HDLC SERPL: 2.3 {RATIO} (ref 2–5)
HDLC SERPL-MCNC: 77 MG/DL (ref 40–75)
HDLC SERPL: 44 % (ref 20–50)
LDLC SERPL CALC-MCNC: 88.6 MG/DL (ref 63–159)
NONHDLC SERPL-MCNC: 98 MG/DL
TRIGL SERPL-MCNC: 47 MG/DL (ref 30–150)

## 2021-04-26 ENCOUNTER — PATIENT MESSAGE (OUTPATIENT)
Dept: RESEARCH | Facility: HOSPITAL | Age: 41
End: 2021-04-26

## 2021-07-20 ENCOUNTER — TELEPHONE (OUTPATIENT)
Dept: OBSTETRICS AND GYNECOLOGY | Facility: CLINIC | Age: 41
End: 2021-07-20

## 2021-07-20 DIAGNOSIS — N76.0 ACUTE VAGINITIS: Primary | ICD-10-CM

## 2021-07-20 RX ORDER — FLUCONAZOLE 150 MG/1
150 TABLET ORAL DAILY
Qty: 2 TABLET | Refills: 0 | Status: SHIPPED | OUTPATIENT
Start: 2021-07-20 | End: 2021-07-22

## 2021-11-04 ENCOUNTER — OFFICE VISIT (OUTPATIENT)
Dept: OBSTETRICS AND GYNECOLOGY | Facility: CLINIC | Age: 41
End: 2021-11-04
Attending: OBSTETRICS & GYNECOLOGY
Payer: COMMERCIAL

## 2021-11-04 VITALS
BODY MASS INDEX: 21.07 KG/M2 | HEIGHT: 64 IN | WEIGHT: 123.44 LBS | DIASTOLIC BLOOD PRESSURE: 84 MMHG | SYSTOLIC BLOOD PRESSURE: 130 MMHG

## 2021-11-04 DIAGNOSIS — N92.4 ABNORMAL PERIMENOPAUSAL BLEEDING: ICD-10-CM

## 2021-11-04 DIAGNOSIS — Z01.419 ENCOUNTER FOR GYNECOLOGICAL EXAMINATION: Primary | ICD-10-CM

## 2021-11-04 PROCEDURE — 87624 HPV HI-RISK TYP POOLED RSLT: CPT | Performed by: OBSTETRICS & GYNECOLOGY

## 2021-11-04 PROCEDURE — 88175 CYTOPATH C/V AUTO FLUID REDO: CPT | Performed by: OBSTETRICS & GYNECOLOGY

## 2021-11-04 PROCEDURE — 99396 PREV VISIT EST AGE 40-64: CPT | Mod: S$GLB,,, | Performed by: OBSTETRICS & GYNECOLOGY

## 2021-11-04 PROCEDURE — 1159F MED LIST DOCD IN RCRD: CPT | Mod: CPTII,S$GLB,, | Performed by: OBSTETRICS & GYNECOLOGY

## 2021-11-04 PROCEDURE — 99999 PR PBB SHADOW E&M-EST. PATIENT-LVL III: CPT | Mod: PBBFAC,,, | Performed by: OBSTETRICS & GYNECOLOGY

## 2021-11-04 PROCEDURE — 1159F PR MEDICATION LIST DOCUMENTED IN MEDICAL RECORD: ICD-10-PCS | Mod: CPTII,S$GLB,, | Performed by: OBSTETRICS & GYNECOLOGY

## 2021-11-04 PROCEDURE — 3075F SYST BP GE 130 - 139MM HG: CPT | Mod: CPTII,S$GLB,, | Performed by: OBSTETRICS & GYNECOLOGY

## 2021-11-04 PROCEDURE — 99999 PR PBB SHADOW E&M-EST. PATIENT-LVL III: ICD-10-PCS | Mod: PBBFAC,,, | Performed by: OBSTETRICS & GYNECOLOGY

## 2021-11-04 PROCEDURE — 99396 PR PREVENTIVE VISIT,EST,40-64: ICD-10-PCS | Mod: S$GLB,,, | Performed by: OBSTETRICS & GYNECOLOGY

## 2021-11-04 PROCEDURE — 3079F DIAST BP 80-89 MM HG: CPT | Mod: CPTII,S$GLB,, | Performed by: OBSTETRICS & GYNECOLOGY

## 2021-11-04 PROCEDURE — 3079F PR MOST RECENT DIASTOLIC BLOOD PRESSURE 80-89 MM HG: ICD-10-PCS | Mod: CPTII,S$GLB,, | Performed by: OBSTETRICS & GYNECOLOGY

## 2021-11-04 PROCEDURE — 3075F PR MOST RECENT SYSTOLIC BLOOD PRESS GE 130-139MM HG: ICD-10-PCS | Mod: CPTII,S$GLB,, | Performed by: OBSTETRICS & GYNECOLOGY

## 2021-11-04 PROCEDURE — 3008F PR BODY MASS INDEX (BMI) DOCUMENTED: ICD-10-PCS | Mod: CPTII,S$GLB,, | Performed by: OBSTETRICS & GYNECOLOGY

## 2021-11-04 PROCEDURE — 3008F BODY MASS INDEX DOCD: CPT | Mod: CPTII,S$GLB,, | Performed by: OBSTETRICS & GYNECOLOGY

## 2021-11-04 RX ORDER — PROGESTERONE 100 MG/1
100 CAPSULE ORAL NIGHTLY
Qty: 90 CAPSULE | Refills: 3 | Status: SHIPPED | OUTPATIENT
Start: 2021-11-04 | End: 2022-08-29

## 2021-11-11 LAB
FINAL PATHOLOGIC DIAGNOSIS: NORMAL
Lab: NORMAL

## 2021-11-15 LAB
HPV HR 12 DNA SPEC QL NAA+PROBE: NEGATIVE
HPV16 AG SPEC QL: NEGATIVE
HPV18 DNA SPEC QL NAA+PROBE: NEGATIVE

## 2022-01-04 ENCOUNTER — PATIENT MESSAGE (OUTPATIENT)
Dept: OBSTETRICS AND GYNECOLOGY | Facility: CLINIC | Age: 42
End: 2022-01-04
Payer: COMMERCIAL

## 2022-01-04 RX ORDER — FLUCONAZOLE 150 MG/1
150 TABLET ORAL ONCE
Qty: 2 TABLET | Refills: 0 | Status: SHIPPED | OUTPATIENT
Start: 2022-01-04 | End: 2022-01-04

## 2022-01-13 ENCOUNTER — LAB VISIT (OUTPATIENT)
Dept: LAB | Facility: HOSPITAL | Age: 42
End: 2022-01-13
Attending: INTERNAL MEDICINE
Payer: COMMERCIAL

## 2022-01-13 ENCOUNTER — OFFICE VISIT (OUTPATIENT)
Dept: INTERNAL MEDICINE | Facility: CLINIC | Age: 42
End: 2022-01-13
Payer: COMMERCIAL

## 2022-01-13 VITALS
HEART RATE: 78 BPM | DIASTOLIC BLOOD PRESSURE: 80 MMHG | HEIGHT: 64 IN | BODY MASS INDEX: 21.43 KG/M2 | TEMPERATURE: 98 F | SYSTOLIC BLOOD PRESSURE: 112 MMHG | WEIGHT: 125.56 LBS

## 2022-01-13 DIAGNOSIS — N92.6 IRREGULAR PERIODS: ICD-10-CM

## 2022-01-13 DIAGNOSIS — D69.6 THROMBOCYTOPENIA: ICD-10-CM

## 2022-01-13 DIAGNOSIS — Z00.00 ANNUAL PHYSICAL EXAM: Primary | ICD-10-CM

## 2022-01-13 DIAGNOSIS — F40.243 ANXIETY WITH FLYING: ICD-10-CM

## 2022-01-13 DIAGNOSIS — H60.543 ECZEMA OF EXTERNAL EAR, BILATERAL: ICD-10-CM

## 2022-01-13 LAB
ALBUMIN SERPL BCP-MCNC: 4.3 G/DL (ref 3.5–5.2)
ALP SERPL-CCNC: 42 U/L (ref 55–135)
ALT SERPL W/O P-5'-P-CCNC: 13 U/L (ref 10–44)
ANION GAP SERPL CALC-SCNC: 10 MMOL/L (ref 8–16)
AST SERPL-CCNC: 21 U/L (ref 10–40)
BASOPHILS # BLD AUTO: 0.02 K/UL (ref 0–0.2)
BASOPHILS NFR BLD: 0.4 % (ref 0–1.9)
BILIRUB SERPL-MCNC: 0.3 MG/DL (ref 0.1–1)
BUN SERPL-MCNC: 13 MG/DL (ref 6–20)
CALCIUM SERPL-MCNC: 9.7 MG/DL (ref 8.7–10.5)
CHLORIDE SERPL-SCNC: 100 MMOL/L (ref 95–110)
CO2 SERPL-SCNC: 27 MMOL/L (ref 23–29)
CREAT SERPL-MCNC: 0.8 MG/DL (ref 0.5–1.4)
DIFFERENTIAL METHOD: ABNORMAL
EOSINOPHIL # BLD AUTO: 0.2 K/UL (ref 0–0.5)
EOSINOPHIL NFR BLD: 4 % (ref 0–8)
ERYTHROCYTE [DISTWIDTH] IN BLOOD BY AUTOMATED COUNT: 12.5 % (ref 11.5–14.5)
EST. GFR  (AFRICAN AMERICAN): >60 ML/MIN/1.73 M^2
EST. GFR  (NON AFRICAN AMERICAN): >60 ML/MIN/1.73 M^2
GLUCOSE SERPL-MCNC: 78 MG/DL (ref 70–110)
HCT VFR BLD AUTO: 40.3 % (ref 37–48.5)
HGB BLD-MCNC: 13.5 G/DL (ref 12–16)
IMM GRANULOCYTES # BLD AUTO: 0.01 K/UL (ref 0–0.04)
IMM GRANULOCYTES NFR BLD AUTO: 0.2 % (ref 0–0.5)
LYMPHOCYTES # BLD AUTO: 1.6 K/UL (ref 1–4.8)
LYMPHOCYTES NFR BLD: 34.5 % (ref 18–48)
MCH RBC QN AUTO: 30.1 PG (ref 27–31)
MCHC RBC AUTO-ENTMCNC: 33.5 G/DL (ref 32–36)
MCV RBC AUTO: 90 FL (ref 82–98)
MONOCYTES # BLD AUTO: 0.3 K/UL (ref 0.3–1)
MONOCYTES NFR BLD: 6.1 % (ref 4–15)
NEUTROPHILS # BLD AUTO: 2.6 K/UL (ref 1.8–7.7)
NEUTROPHILS NFR BLD: 54.8 % (ref 38–73)
NRBC BLD-RTO: 0 /100 WBC
PLATELET # BLD AUTO: 141 K/UL (ref 150–450)
PMV BLD AUTO: 11 FL (ref 9.2–12.9)
POTASSIUM SERPL-SCNC: 4.3 MMOL/L (ref 3.5–5.1)
PROT SERPL-MCNC: 7.1 G/DL (ref 6–8.4)
RBC # BLD AUTO: 4.49 M/UL (ref 4–5.4)
SODIUM SERPL-SCNC: 137 MMOL/L (ref 136–145)
TSH SERPL DL<=0.005 MIU/L-ACNC: 0.63 UIU/ML (ref 0.4–4)
WBC # BLD AUTO: 4.73 K/UL (ref 3.9–12.7)

## 2022-01-13 PROCEDURE — 1159F MED LIST DOCD IN RCRD: CPT | Mod: CPTII,S$GLB,, | Performed by: INTERNAL MEDICINE

## 2022-01-13 PROCEDURE — 3074F PR MOST RECENT SYSTOLIC BLOOD PRESSURE < 130 MM HG: ICD-10-PCS | Mod: CPTII,S$GLB,, | Performed by: INTERNAL MEDICINE

## 2022-01-13 PROCEDURE — 3008F PR BODY MASS INDEX (BMI) DOCUMENTED: ICD-10-PCS | Mod: CPTII,S$GLB,, | Performed by: INTERNAL MEDICINE

## 2022-01-13 PROCEDURE — 1159F PR MEDICATION LIST DOCUMENTED IN MEDICAL RECORD: ICD-10-PCS | Mod: CPTII,S$GLB,, | Performed by: INTERNAL MEDICINE

## 2022-01-13 PROCEDURE — 84443 ASSAY THYROID STIM HORMONE: CPT | Performed by: INTERNAL MEDICINE

## 2022-01-13 PROCEDURE — 99999 PR PBB SHADOW E&M-EST. PATIENT-LVL III: CPT | Mod: PBBFAC,,, | Performed by: INTERNAL MEDICINE

## 2022-01-13 PROCEDURE — 1160F PR REVIEW ALL MEDS BY PRESCRIBER/CLIN PHARMACIST DOCUMENTED: ICD-10-PCS | Mod: CPTII,S$GLB,, | Performed by: INTERNAL MEDICINE

## 2022-01-13 PROCEDURE — 80053 COMPREHEN METABOLIC PANEL: CPT | Performed by: INTERNAL MEDICINE

## 2022-01-13 PROCEDURE — 99396 PR PREVENTIVE VISIT,EST,40-64: ICD-10-PCS | Mod: S$GLB,,, | Performed by: INTERNAL MEDICINE

## 2022-01-13 PROCEDURE — 3008F BODY MASS INDEX DOCD: CPT | Mod: CPTII,S$GLB,, | Performed by: INTERNAL MEDICINE

## 2022-01-13 PROCEDURE — 1160F RVW MEDS BY RX/DR IN RCRD: CPT | Mod: CPTII,S$GLB,, | Performed by: INTERNAL MEDICINE

## 2022-01-13 PROCEDURE — 3079F DIAST BP 80-89 MM HG: CPT | Mod: CPTII,S$GLB,, | Performed by: INTERNAL MEDICINE

## 2022-01-13 PROCEDURE — 99999 PR PBB SHADOW E&M-EST. PATIENT-LVL III: ICD-10-PCS | Mod: PBBFAC,,, | Performed by: INTERNAL MEDICINE

## 2022-01-13 PROCEDURE — 3079F PR MOST RECENT DIASTOLIC BLOOD PRESSURE 80-89 MM HG: ICD-10-PCS | Mod: CPTII,S$GLB,, | Performed by: INTERNAL MEDICINE

## 2022-01-13 PROCEDURE — 85025 COMPLETE CBC W/AUTO DIFF WBC: CPT | Performed by: INTERNAL MEDICINE

## 2022-01-13 PROCEDURE — 36415 COLL VENOUS BLD VENIPUNCTURE: CPT | Performed by: INTERNAL MEDICINE

## 2022-01-13 PROCEDURE — 3074F SYST BP LT 130 MM HG: CPT | Mod: CPTII,S$GLB,, | Performed by: INTERNAL MEDICINE

## 2022-01-13 PROCEDURE — 99396 PREV VISIT EST AGE 40-64: CPT | Mod: S$GLB,,, | Performed by: INTERNAL MEDICINE

## 2022-01-13 RX ORDER — GUAIFENESIN, PSEUDOEPHEDRINE HYDROCHLORIDE 600; 60 MG/1; MG/1
1 TABLET, EXTENDED RELEASE ORAL
COMMUNITY
End: 2022-01-13

## 2022-01-13 RX ORDER — FLUOCINOLONE ACETONIDE 0.11 MG/ML
2 OIL AURICULAR (OTIC) 2 TIMES DAILY PRN
Qty: 20 ML | Refills: 2 | Status: SHIPPED | OUTPATIENT
Start: 2022-01-13 | End: 2023-03-02

## 2022-01-13 RX ORDER — ALPRAZOLAM 1 MG/1
1 TABLET ORAL DAILY PRN
Qty: 30 TABLET | Refills: 0 | Status: SHIPPED | OUTPATIENT
Start: 2022-01-13 | End: 2023-03-02

## 2022-01-13 RX ORDER — DOXYCYCLINE 100 MG/1
100 CAPSULE ORAL 2 TIMES DAILY
Qty: 14 CAPSULE | Refills: 0 | Status: SHIPPED | OUTPATIENT
Start: 2022-01-13 | End: 2022-08-29

## 2022-01-14 NOTE — PROGRESS NOTES
Subjective:       Patient ID: Rakan Castro is a 41 y.o. female.    Chief Complaint: Annual Exam    41-year-old nonsmoking female here for an annual physical exam.  She is extremely healthy.  She did get a upper respiratory infection a week ago and has developed into her cast.  He does not have a history of asthma but she has had a history of pneumonia a few times.  She also has a history of borderline thrombocytopenia intermittent over the years.    Review of Systems   Constitutional: Negative for activity change, appetite change, chills, diaphoresis, fatigue, fever and unexpected weight change.   HENT: Positive for ear discharge. Negative for nasal congestion, ear pain, mouth sores, postnasal drip, sinus pressure/congestion, sore throat and trouble swallowing.         Flaking and itching of the ears   Eyes: Negative for pain, redness and visual disturbance.   Respiratory: Negative for apnea, cough, chest tightness, shortness of breath and wheezing.    Cardiovascular: Negative for chest pain, palpitations and leg swelling.   Gastrointestinal: Negative for abdominal distention, abdominal pain, blood in stool, constipation, diarrhea, nausea and vomiting.   Endocrine: Negative for cold intolerance, polydipsia, polyphagia and polyuria.   Genitourinary: Negative for difficulty urinating, dysuria, flank pain, frequency, hematuria, menstrual problem, pelvic pain and urgency.   Musculoskeletal: Negative for arthralgias, back pain, joint swelling and neck pain.   Integumentary:  Negative for color change, rash and wound.   Neurological: Negative for dizziness, tremors, seizures, syncope, weakness, light-headedness, numbness and headaches.   Hematological: Negative for adenopathy. Does not bruise/bleed easily.   Psychiatric/Behavioral: Negative for confusion, decreased concentration, dysphoric mood, hallucinations, self-injury, sleep disturbance and suicidal ideas. The patient is not nervous/anxious.           Objective:      Physical Exam  HENT:      Head: Normocephalic and atraumatic.      Right Ear: Tympanic membrane normal.      Left Ear: There is impacted cerumen.   Eyes:      General: No scleral icterus.     Conjunctiva/sclera: Conjunctivae normal.   Cardiovascular:      Rate and Rhythm: Normal rate and regular rhythm.      Heart sounds: No murmur heard.      Pulmonary:      Effort: Pulmonary effort is normal.      Breath sounds: Normal breath sounds. No rales.   Abdominal:      General: Bowel sounds are normal. There is no distension.      Palpations: Abdomen is soft.      Tenderness: There is no abdominal tenderness.   Musculoskeletal:         General: No tenderness.      Cervical back: Normal range of motion and neck supple. No tenderness.   Neurological:      Mental Status: She is alert and oriented to person, place, and time.   Psychiatric:         Mood and Affect: Mood normal.         Behavior: Behavior normal.         Thought Content: Thought content normal.         Judgment: Judgment normal.         Assessment/plan         Annual physical exam     Health Maintenance   Topic Date Due    Hepatitis C Screening  Never done    TETANUS VACCINE  Never done    Mammogram  02/24/2023    Lipid Panel  Completed     Health Maintenance Reviewed    Irregular periods  Comments:  follows up with ob/gyn   Orders:  -     CBC Auto Differential; Future; Expected date: 01/13/2022  -     TSH; Future; Expected date: 01/13/2022    Thrombocytopenia  Comments:  Would continue to follow and as we discussed if is any easy signs of bleeding you need to get a recheck on your platelet count  Orders:  -     Comprehensive Metabolic Panel; Future; Expected date: 01/13/2022  -     CBC Auto Differential; Future; Expected date: 01/13/2022    Eczema of external ear, bilateral   -     fluocinolone acetonide oiL 0.01 % Drop; Place 2 drops in ear(s) 2 (two) times daily as needed (itching/eczema).  Dispense: 20 mL; Refill: 2    Flying anxiety    -     ALPRAZolam (XANAX) 1 MG tablet; Take 1 tablet (1 mg total) by mouth daily as needed for Anxiety.  Dispense: 30 tablet; Refill: 0

## 2022-05-03 ENCOUNTER — PATIENT MESSAGE (OUTPATIENT)
Dept: RESEARCH | Facility: HOSPITAL | Age: 42
End: 2022-05-03
Payer: COMMERCIAL

## 2022-05-23 ENCOUNTER — OFFICE VISIT (OUTPATIENT)
Dept: OTOLARYNGOLOGY | Facility: CLINIC | Age: 42
End: 2022-05-23
Payer: COMMERCIAL

## 2022-05-23 VITALS
WEIGHT: 127.19 LBS | HEART RATE: 77 BPM | DIASTOLIC BLOOD PRESSURE: 79 MMHG | BODY MASS INDEX: 21.71 KG/M2 | HEIGHT: 64 IN | SYSTOLIC BLOOD PRESSURE: 109 MMHG

## 2022-05-23 DIAGNOSIS — H92.03 EAR PAIN, BILATERAL: Chronic | ICD-10-CM

## 2022-05-23 DIAGNOSIS — H60.63 CHRONIC OTITIS EXTERNA OF BOTH EARS, UNSPECIFIED TYPE: Primary | Chronic | ICD-10-CM

## 2022-05-23 PROCEDURE — 3078F PR MOST RECENT DIASTOLIC BLOOD PRESSURE < 80 MM HG: ICD-10-PCS | Mod: CPTII,S$GLB,, | Performed by: OTOLARYNGOLOGY

## 2022-05-23 PROCEDURE — 99204 OFFICE O/P NEW MOD 45 MIN: CPT | Mod: S$GLB,,, | Performed by: OTOLARYNGOLOGY

## 2022-05-23 PROCEDURE — 3008F BODY MASS INDEX DOCD: CPT | Mod: CPTII,S$GLB,, | Performed by: OTOLARYNGOLOGY

## 2022-05-23 PROCEDURE — 3078F DIAST BP <80 MM HG: CPT | Mod: CPTII,S$GLB,, | Performed by: OTOLARYNGOLOGY

## 2022-05-23 PROCEDURE — 3074F SYST BP LT 130 MM HG: CPT | Mod: CPTII,S$GLB,, | Performed by: OTOLARYNGOLOGY

## 2022-05-23 PROCEDURE — 3008F PR BODY MASS INDEX (BMI) DOCUMENTED: ICD-10-PCS | Mod: CPTII,S$GLB,, | Performed by: OTOLARYNGOLOGY

## 2022-05-23 PROCEDURE — 99999 PR PBB SHADOW E&M-EST. PATIENT-LVL III: ICD-10-PCS | Mod: PBBFAC,,, | Performed by: OTOLARYNGOLOGY

## 2022-05-23 PROCEDURE — 99204 PR OFFICE/OUTPT VISIT, NEW, LEVL IV, 45-59 MIN: ICD-10-PCS | Mod: S$GLB,,, | Performed by: OTOLARYNGOLOGY

## 2022-05-23 PROCEDURE — 3074F PR MOST RECENT SYSTOLIC BLOOD PRESSURE < 130 MM HG: ICD-10-PCS | Mod: CPTII,S$GLB,, | Performed by: OTOLARYNGOLOGY

## 2022-05-23 PROCEDURE — 1159F PR MEDICATION LIST DOCUMENTED IN MEDICAL RECORD: ICD-10-PCS | Mod: CPTII,S$GLB,, | Performed by: OTOLARYNGOLOGY

## 2022-05-23 PROCEDURE — 99999 PR PBB SHADOW E&M-EST. PATIENT-LVL III: CPT | Mod: PBBFAC,,, | Performed by: OTOLARYNGOLOGY

## 2022-05-23 PROCEDURE — 1159F MED LIST DOCD IN RCRD: CPT | Mod: CPTII,S$GLB,, | Performed by: OTOLARYNGOLOGY

## 2022-05-23 RX ORDER — TRETINOIN 0.5 MG/G
LOTION TOPICAL
COMMUNITY
Start: 2022-04-14

## 2022-05-23 RX ORDER — CELECOXIB 200 MG/1
200 CAPSULE ORAL 2 TIMES DAILY
COMMUNITY
Start: 2022-05-10 | End: 2022-08-29

## 2022-05-23 NOTE — PATIENT INSTRUCTIONS
I would recommend the use of an over the counter moisturizing drop such as baby oil, mineral oil, Vitamin E oil, etc on a weekly basis.    You should avoid Qtip use in the ears.    If the ear feels wet, use a hairdryer on a cool setting to dry the ears.    Fluocinolone Oil (DermOtic) drops may have been prescribed and can be used daily or weekly as needed for itching.        Swim Ear/Ear Drying Mixture Recipe:  Mix equal parts rubbing alcohol and white vinegar.  Store in airtight container.  Use a syringe or dropper to place 4-5 drops in the affected ear after swimming or showering to help dry the ear.   DO NOT use this mixture on anyone who has tubes in the ears or a hole in the eardrum.

## 2022-05-23 NOTE — PROGRESS NOTES
Chief Complaint   Patient presents with    Otalgia     Right ear, pain comes and goes    Itching     Inside both ears   .     HPI: Rakan Castro is 42 y.o. female who isself-referred for evaluation of bilateral ear pain.  Symptoms include bilateral ear drainage , bilateral ear pain and ear itching. Symptoms are intermittent, worse in the winter, and occasionally she will have OE in the summer with swimming. Patient denies fever, myalgias, nasal congestion, productive cough, sneezing and sore throat. Ear history: a few previous ear infections. She denies to hearing loss. she denies bruxism or dental issues.      Previous ENT treated with mometasone drops which helped.  Never needed recurrent antibiotics and never had recurrent CI issues.    No history of AR or other sinus or nasal issues that worsen ear symptoms.     Past Medical History:   Diagnosis Date    Anxiety     xanax prn flying    Thrombocytopenia      Social History     Socioeconomic History    Marital status:    Occupational History    Occupation: [employer]   Tobacco Use    Smoking status: Never Smoker    Smokeless tobacco: Never Used   Substance and Sexual Activity    Alcohol use: Yes     Comment: 2-3 days per week    Drug use: No    Sexual activity: Yes     Partners: Male     Birth control/protection: Partner-Vasectomy     Comment: : Monogamous   Social History Narrative    Lives w/ and 3 children.       Social Determinants of Health     Financial Resource Strain: Low Risk     Difficulty of Paying Living Expenses: Not hard at all   Food Insecurity: No Food Insecurity    Worried About Running Out of Food in the Last Year: Never true    Ran Out of Food in the Last Year: Never true   Transportation Needs: No Transportation Needs    Lack of Transportation (Medical): No    Lack of Transportation (Non-Medical): No   Physical Activity: Sufficiently Active    Days of Exercise per Week: 5 days    Minutes of  Exercise per Session: 50 min   Stress: No Stress Concern Present    Feeling of Stress : Only a little   Social Connections: Unknown    Frequency of Communication with Friends and Family: More than three times a week    Frequency of Social Gatherings with Friends and Family: Twice a week    Active Member of Clubs or Organizations: Yes    Attends Club or Organization Meetings: 1 to 4 times per year    Marital Status:    Housing Stability: Low Risk     Unable to Pay for Housing in the Last Year: No    Number of Places Lived in the Last Year: 1    Unstable Housing in the Last Year: No     Past Surgical History:   Procedure Laterality Date     SECTION      x 3     Family History   Problem Relation Age of Onset    No Known Problems Father     No Known Problems Mother     No Known Problems Brother     Breast cancer Maternal Aunt     Cancer Maternal Aunt         breast- postmenopausal    No Known Problems Son     Cancer Maternal Grandmother         mets to bone    Diabetes Neg Hx     Colon cancer Neg Hx            Review of Systems  General: negative for chills, fever or weight loss  Psychological: negative for mood changes or depression  Ophthalmic: negative for blurry vision, photophobia or eye pain  ENT: see HPI  Respiratory: no cough, shortness of breath, or wheezing  Cardiovascular: no chest pain or dyspnea on exertion  Gastrointestinal: no abdominal pain, change in bowel habits, or black/ bloody stools  Musculoskeletal: negative for gait disturbance or muscular weakness  Neurological: no syncope or seizures; no ataxia  Dermatological: negative for pruritis,  rash and jaundice  Hematologic/lymphatic: no easy bruising, no new adenopathy      Physical Exam:    Vitals:    22 0825   BP: 109/79   Pulse: 77       Constitutional: Well appearing / communicating without difficutly.  NAD.  Eyes: EOM I Bilaterally  Head/Face: Normocephalic.  Negative paranasal sinus pressure/tenderness.   Salivary glands WNL.  House Brackmann I Bilaterally.    Right Ear: Auricle normal appearance. External Auditory Canal within normal limits no lesions or masses,TM w/o masses/lesions/perforations. EAC somewhat dry and mild JULIA. TM mobility noted.   Left Ear: Auricle normal appearance. External Auditory Canal within normal limits no lesions or masses,TM w/o masses/lesions/perforations. EAC somewhat dry and mild JULIA. TM mobility noted.  Nose: No gross nasal septal deviation. Inferior Turbinates 2+ bilaterally. No septal perforation. No masses/lesions. External nasal skin appears normal without masses/lesions.  Oral Cavity: Gingiva/lips within normal limits.  Dentition/gingiva healthy appearing. Mucus membranes moist. Floor of mouth soft, no masses palpated. Oral Tongue mobile. Hard Palate appears normal.    Oropharynx: Base of tongue appears normal. No masses/lesions noted. Tonsillar fossa/pharyngeal wall without lesions. Posterior oropharynx WNL.  Soft palate without masses. Midline uvula.   Neck/Lymphatic: No LAD I-VI bilaterally.  No thyromegaly.  No masses noted on exam.        Assessment:    ICD-10-CM ICD-9-CM    1. Chronic otitis externa of both ears, unspecified type  H60.63 380.23    2. Ear pain, bilateral  H92.03 388.70      The primary encounter diagnosis was Chronic otitis externa of both ears, unspecified type. A diagnosis of Ear pain, bilateral was also pertinent to this visit.      Plan:  No orders of the defined types were placed in this encounter.  I would recommend the use of an over the counter moisturizing drop such as baby oil, mineral oil, Vitamin E oil, etc on a weekly basis.    You should avoid Qtip use in the ears.    If the ear feels wet, use a hairdryer on a cool setting to dry the ears.    Fluocinolone Oil (DermOtic) drops may have been prescribed and can be used daily or weekly as needed for itching.      Swim Ear/Ear Drying Mixture Recipe:  Mix equal parts rubbing alcohol and white vinegar.   Store in airtight container.  Use a syringe or dropper to place 4-5 drops in the affected ear after swimming or showering to help dry the ear.   DO NOT use this mixture on anyone who has tubes in the ears or a hole in the eardrum.       Follow up in one year or PRN.         Melissa Sanchez MD

## 2022-08-29 ENCOUNTER — OFFICE VISIT (OUTPATIENT)
Dept: OTOLARYNGOLOGY | Facility: CLINIC | Age: 42
End: 2022-08-29
Payer: COMMERCIAL

## 2022-08-29 VITALS
WEIGHT: 124.31 LBS | HEIGHT: 64 IN | BODY MASS INDEX: 21.22 KG/M2 | HEART RATE: 82 BPM | SYSTOLIC BLOOD PRESSURE: 114 MMHG | TEMPERATURE: 99 F | DIASTOLIC BLOOD PRESSURE: 83 MMHG

## 2022-08-29 DIAGNOSIS — J30.9 ALLERGIC RHINITIS, UNSPECIFIED SEASONALITY, UNSPECIFIED TRIGGER: Chronic | ICD-10-CM

## 2022-08-29 DIAGNOSIS — H60.313 ACUTE DIFFUSE OTITIS EXTERNA OF BOTH EARS: ICD-10-CM

## 2022-08-29 DIAGNOSIS — H60.63 CHRONIC OTITIS EXTERNA OF BOTH EARS, UNSPECIFIED TYPE: Primary | Chronic | ICD-10-CM

## 2022-08-29 DIAGNOSIS — H92.03 EAR PAIN, BILATERAL: ICD-10-CM

## 2022-08-29 DIAGNOSIS — H92.13 OTORRHEA OF BOTH EARS: Chronic | ICD-10-CM

## 2022-08-29 DIAGNOSIS — H61.23 BILATERAL IMPACTED CERUMEN: ICD-10-CM

## 2022-08-29 PROCEDURE — 3008F PR BODY MASS INDEX (BMI) DOCUMENTED: ICD-10-PCS | Mod: CPTII,S$GLB,, | Performed by: OTOLARYNGOLOGY

## 2022-08-29 PROCEDURE — 69210 REMOVE IMPACTED EAR WAX UNI: CPT | Mod: S$GLB,,, | Performed by: OTOLARYNGOLOGY

## 2022-08-29 PROCEDURE — 99999 PR PBB SHADOW E&M-EST. PATIENT-LVL III: CPT | Mod: PBBFAC,,, | Performed by: OTOLARYNGOLOGY

## 2022-08-29 PROCEDURE — 3074F PR MOST RECENT SYSTOLIC BLOOD PRESSURE < 130 MM HG: ICD-10-PCS | Mod: CPTII,S$GLB,, | Performed by: OTOLARYNGOLOGY

## 2022-08-29 PROCEDURE — 3079F DIAST BP 80-89 MM HG: CPT | Mod: CPTII,S$GLB,, | Performed by: OTOLARYNGOLOGY

## 2022-08-29 PROCEDURE — 1159F PR MEDICATION LIST DOCUMENTED IN MEDICAL RECORD: ICD-10-PCS | Mod: CPTII,S$GLB,, | Performed by: OTOLARYNGOLOGY

## 2022-08-29 PROCEDURE — 99214 PR OFFICE/OUTPT VISIT, EST, LEVL IV, 30-39 MIN: ICD-10-PCS | Mod: 25,S$GLB,, | Performed by: OTOLARYNGOLOGY

## 2022-08-29 PROCEDURE — 3079F PR MOST RECENT DIASTOLIC BLOOD PRESSURE 80-89 MM HG: ICD-10-PCS | Mod: CPTII,S$GLB,, | Performed by: OTOLARYNGOLOGY

## 2022-08-29 PROCEDURE — 99999 PR PBB SHADOW E&M-EST. PATIENT-LVL III: ICD-10-PCS | Mod: PBBFAC,,, | Performed by: OTOLARYNGOLOGY

## 2022-08-29 PROCEDURE — 1159F MED LIST DOCD IN RCRD: CPT | Mod: CPTII,S$GLB,, | Performed by: OTOLARYNGOLOGY

## 2022-08-29 PROCEDURE — 99214 OFFICE O/P EST MOD 30 MIN: CPT | Mod: 25,S$GLB,, | Performed by: OTOLARYNGOLOGY

## 2022-08-29 PROCEDURE — 69210 EAR CERUMEN REMOVAL: ICD-10-PCS | Mod: S$GLB,,, | Performed by: OTOLARYNGOLOGY

## 2022-08-29 PROCEDURE — 3008F BODY MASS INDEX DOCD: CPT | Mod: CPTII,S$GLB,, | Performed by: OTOLARYNGOLOGY

## 2022-08-29 PROCEDURE — 3074F SYST BP LT 130 MM HG: CPT | Mod: CPTII,S$GLB,, | Performed by: OTOLARYNGOLOGY

## 2022-08-29 RX ORDER — CIPROFLOXACIN AND DEXAMETHASONE 3; 1 MG/ML; MG/ML
4 SUSPENSION/ DROPS AURICULAR (OTIC) 2 TIMES DAILY
Qty: 7.5 ML | Refills: 0 | Status: SHIPPED | OUTPATIENT
Start: 2022-08-29 | End: 2022-09-08

## 2022-08-29 NOTE — PROCEDURES
Ear Cerumen Removal    Date/Time: 8/29/2022 2:40 PM  Performed by: Melissa Sanchez MD  Authorized by: Melissa Sanchez MD     Consent Done?:  Yes (Verbal)  Location details:  Both ears  Procedure type: curette    Procedure type comment:  Suction  Cerumen  Removal Results:  Cerumen completely removed  Patient tolerance:  Patient tolerated the procedure well with no immediate complications     Dry skin and skin irritation bilateral ear canals, erythema and edema of the skin of the ear canals bilaterally, consistent with chronic otitis externa with acute exacerbation.    Melissa Sanchez MD  [facility]

## 2022-08-29 NOTE — PROGRESS NOTES
Chief Complaint   Patient presents with    Ear Drainage     Only in right ear     Ear Fullness    Otitis Media     Mostly left, but right is also bothersome. X 1 month--the past two weeks or so it has gotten worse       HPI:  Patient is a 42 years old female who has previously seen me for otalgia, chronic otitis externa, otorrhea.      Since the last visit, the patient reports she has been using the Dermotic drops, but she has noticed that after swimming or being in the water she will get increase in ear discomfort, swelling, and some drainage.  She has had worsening of these symptoms over the last couple weeks and now has some ear pain and discomfort.    She is not noticed any changes in hearing, does not complain of any bruxism, and she does have some mild allergy symptoms but is not taking anything daily for this issue.    Active Ambulatory Problems     Diagnosis Date Noted    Disorder involving thrombocytopenia 01/07/2014    Generalized anxiety disorder 01/07/2014    Menorrhagia with regular cycle 01/29/2017     Resolved Ambulatory Problems     Diagnosis Date Noted    No Resolved Ambulatory Problems     Past Medical History:   Diagnosis Date    Anxiety     Thrombocytopenia        Review of Systems  General: negative for chills, fever or weight loss  Psychological: negative for mood changes or depression  Ophthalmic: negative for blurry vision, photophobia or eye pain  ENT: see HPI  Respiratory: no cough, shortness of breath, or wheezing  Cardiovascular: no chest pain or dyspnea on exertion  Gastrointestinal: no abdominal pain, change in bowel habits, or black/ bloody stools  Musculoskeletal: negative for gait disturbance or muscular weakness  Neurological: no syncope or seizures; no ataxia  Dermatological: negative for pruritis,  rash and jaundice  Hematologic/lymphatic: no easy bruising, no new adenopathy    Physical Exam     Vitals:    08/29/22 1451   BP: 114/83   Pulse: 82   Temp: 99.4 °F (37.4 °C)        Constitutional: Well appearing / communicating.  NAD.  Eyes: EOM I Bilaterally  Head/Face: Normocephalic.  Negative paranasal sinus pressure/tenderness.  Salivary glands WNL.  House Brackmann I Bilaterally.    Right Ear: External Auditory Canal with partial wax impaction, erythema and swelling of the skin of the ear canal,TM w/o masses/lesions/perforations.  Auricle WNL.  Left Ear: External Auditory Canal partial wax impaction, erythema and swelling of the skin of the ear canal, no active otorrhea, TM w/o masses/lesions/perforations. Auricle WNL.  Nose: No gross nasal septal deviation. Inferior Turbinates 3+ bilaterally.  Mildly allergic mucosa.  No septal perforation. No masses/lesions. External nasal skin without masses/lesions.  Oral Cavity: Gingiva/lips WNL.  FOM Soft, no masses palpated. Oral Tongue mobile. Hard Palate WNL.   Oropharynx: BOT WNL. No masses/lesions noted. Tonsillar fossa/pharyngeal wall without lesions. Posterior oropharynx WNL.  Soft palate without masses. Midline uvula.   Neck/Lymphatic: No LAD I-VI bilaterally.  No thyromegaly.  No masses noted on exam.    Mirror laryngoscopy/nasopharyngoscopy: Active gag reflex.  Unable to perform.    Neuro/Psychiatric: AOx3.  Normal mood and affect.   Cardiovascular: Normal carotid pulses bilaterally, no increasing jugular venous distention noted at cervical region bilaterally.    Respiratory: Normal respiratory effort, no stridor, no retractions noted.    See separate procedure note for cerumen impaction removal.    Assessment/Plan:    Rakan was seen today for ear drainage, ear fullness and otitis media.    Diagnoses and all orders for this visit:    Chronic otitis externa of both ears, unspecified type  -     ciprofloxacin-dexamethasone 0.3-0.1% (CIPRODEX) 0.3-0.1 % DrpS; Place 4 drops into both ears 2 (two) times daily. for 10 days    Ear pain, bilateral    Acute diffuse otitis externa of both ears    Allergic rhinitis, unspecified seasonality,  unspecified trigger    Otorrhea of both ears    Bilateral impacted cerumen      Patient will begin Ciprodex drops used bilaterally.  I will order [employer] otic powder for her to try, as the moisture issue in the ears has been a big problem for her and these will help better with that verses ear drops.  She will use them daily for the next week or 2 and then change to once or twice weekly for maintenance.  She was instructed on using swim ear/ear drying solution and using a hair dryer to dry the ear daily.  She will follow-up in 6-8 weeks for recheck of the ears and she will contact me if there are any problems or worsening of the issue before then.    Melissa Sanchez MD  [facility]

## 2022-08-29 NOTE — PATIENT INSTRUCTIONS
Start ciprodex today.  Use until professional arts ear powder is available.    Use ear powder acc to directions.      You should avoid Qtip use in the ears.    If the ear feels wet, use a hairdryer on a cool setting to dry the ears.    Fluocinolone Oil (DermOtic) drops may have been prescribed and can be used daily or weekly as needed for itching.       Swim Ear/Ear Drying Mixture Recipe:  Mix equal parts rubbing alcohol and white vinegar.  Store in airtight container.  Use a syringe or dropper to place 4-5 drops in the affected ear after swimming or showering to help dry the ear.   DO NOT use this mixture on anyone who has tubes in the ears or a hole in the eardrum.

## 2022-08-30 ENCOUNTER — TELEPHONE (OUTPATIENT)
Dept: OTOLARYNGOLOGY | Facility: CLINIC | Age: 42
End: 2022-08-30
Payer: COMMERCIAL

## 2022-08-30 NOTE — TELEPHONE ENCOUNTER
Returned call to Dax and provided patients phone number and address. Was thanked for calling.  ----- Message from More Patel sent at 8/30/2022  9:27 AM CDT -----  Contact: [phone]  Who Called: Dax   Regarding: script was sent with none of the Pt info   Would the patient rather a call back or a response via [facility]? Call back  Best Call Back Number:  [phone]   Additional Information: n/a

## 2022-11-14 ENCOUNTER — TELEPHONE (OUTPATIENT)
Dept: OTOLARYNGOLOGY | Facility: CLINIC | Age: 42
End: 2022-11-14
Payer: COMMERCIAL

## 2022-11-14 NOTE — TELEPHONE ENCOUNTER
Returned call to patient and advised her that we do not have any appointments as of right now for Dr. Sanchez on tomorrow but that if anyone cancels we would give her a call back. Patient was also advised that Dr. Sanchez is not in clinic the remainder of the week. Patient verbalized understanding and thanked me for calling.   ----- Message from Jared Bob sent at 11/14/2022  8:26 AM CST -----  Type:  Sooner Appoointment Request    Caller is requesting a sooner appointment.  Caller declined first available appointment listed below.  Caller will not accept being placed on the waitlist and is requesting a message be sent to doctor.  Name of Caller:patent  When is the first available appointment?patient  Symptoms:ear pain  Would the patient rather a call back or a response via [facility]? call  Best Call Back Number:[phone]  Additional Information: patient is requesting an appointment this week.

## 2023-03-02 ENCOUNTER — OFFICE VISIT (OUTPATIENT)
Dept: OBSTETRICS AND GYNECOLOGY | Facility: CLINIC | Age: 43
End: 2023-03-02
Attending: OBSTETRICS & GYNECOLOGY
Payer: COMMERCIAL

## 2023-03-02 VITALS
DIASTOLIC BLOOD PRESSURE: 60 MMHG | BODY MASS INDEX: 21.54 KG/M2 | HEIGHT: 64 IN | WEIGHT: 126.19 LBS | SYSTOLIC BLOOD PRESSURE: 100 MMHG

## 2023-03-02 DIAGNOSIS — Z12.31 SCREENING MAMMOGRAM FOR BREAST CANCER: ICD-10-CM

## 2023-03-02 DIAGNOSIS — N89.8 VAGINAL DISCHARGE: ICD-10-CM

## 2023-03-02 DIAGNOSIS — Z01.419 ENCOUNTER FOR GYNECOLOGICAL EXAMINATION: Primary | ICD-10-CM

## 2023-03-02 PROCEDURE — 3078F DIAST BP <80 MM HG: CPT | Mod: CPTII,S$GLB,, | Performed by: OBSTETRICS & GYNECOLOGY

## 2023-03-02 PROCEDURE — 3008F BODY MASS INDEX DOCD: CPT | Mod: CPTII,S$GLB,, | Performed by: OBSTETRICS & GYNECOLOGY

## 2023-03-02 PROCEDURE — 3078F PR MOST RECENT DIASTOLIC BLOOD PRESSURE < 80 MM HG: ICD-10-PCS | Mod: CPTII,S$GLB,, | Performed by: OBSTETRICS & GYNECOLOGY

## 2023-03-02 PROCEDURE — 99396 PR PREVENTIVE VISIT,EST,40-64: ICD-10-PCS | Mod: S$GLB,,, | Performed by: OBSTETRICS & GYNECOLOGY

## 2023-03-02 PROCEDURE — 3074F PR MOST RECENT SYSTOLIC BLOOD PRESSURE < 130 MM HG: ICD-10-PCS | Mod: CPTII,S$GLB,, | Performed by: OBSTETRICS & GYNECOLOGY

## 2023-03-02 PROCEDURE — 3008F PR BODY MASS INDEX (BMI) DOCUMENTED: ICD-10-PCS | Mod: CPTII,S$GLB,, | Performed by: OBSTETRICS & GYNECOLOGY

## 2023-03-02 PROCEDURE — 3074F SYST BP LT 130 MM HG: CPT | Mod: CPTII,S$GLB,, | Performed by: OBSTETRICS & GYNECOLOGY

## 2023-03-02 PROCEDURE — 99999 PR PBB SHADOW E&M-EST. PATIENT-LVL III: CPT | Mod: PBBFAC,,, | Performed by: OBSTETRICS & GYNECOLOGY

## 2023-03-02 PROCEDURE — 99396 PREV VISIT EST AGE 40-64: CPT | Mod: S$GLB,,, | Performed by: OBSTETRICS & GYNECOLOGY

## 2023-03-02 PROCEDURE — 99999 PR PBB SHADOW E&M-EST. PATIENT-LVL III: ICD-10-PCS | Mod: PBBFAC,,, | Performed by: OBSTETRICS & GYNECOLOGY

## 2023-03-02 RX ORDER — DROSPIRENONE AND ESTETROL 3-14.2(28)
1 KIT ORAL DAILY
Qty: 90 TABLET | Refills: 3 | Status: SHIPPED | OUTPATIENT
Start: 2023-03-02 | End: 2024-03-18

## 2023-03-02 RX ORDER — ALPRAZOLAM 1 MG/1
TABLET ORAL
COMMUNITY
End: 2023-08-21 | Stop reason: SDUPTHER

## 2023-03-02 RX ORDER — DROSPIRENONE AND ESTETROL 3-14.2(28)
1 KIT ORAL DAILY
Qty: 90 TABLET | Refills: 3 | Status: SHIPPED | OUTPATIENT
Start: 2023-03-02 | End: 2023-03-02 | Stop reason: SDUPTHER

## 2023-03-02 RX ORDER — CIPROFLOXACIN AND DEXAMETHASONE 3; 1 MG/ML; MG/ML
SUSPENSION/ DROPS AURICULAR (OTIC)
COMMUNITY

## 2023-03-03 NOTE — PROGRESS NOTES
"Chief Complaint: well woman exam  Well Woman (LAST PAP 21 NEG HPV NEG, MAMMO 21  AND U/S NORMAL at [facility] )    She is established    Rakan Castro is a 43 y.o. female  presents for a well woman exam.    c/o increased ovulatory mucus - tried Prometrium   Offered OCP     ROS:  No abdominal pain. No vaginal discharge  No breast pain or masses, No rectal bleeding       Cycles:  regular and monthly  LAST PAP 21 NEG HPV NEG,   Last MAMMO 21  AND U/S NORMAL at [facility]     Past Medical History:   Diagnosis Date    Anxiety     xanax prn flying    Thrombocytopenia        Past Surgical History:   Procedure Laterality Date     SECTION      x 3       OB History    Para Term  AB Living   3 3 3     3   SAB IAB Ectopic Multiple Live Births           3      # Outcome Date GA Lbr Curry/2nd Weight Sex Delivery Anes PTL Lv   3 Term  38w0d  2.892 kg (6 lb 6 oz) M CS-LTranv   SONIA   2 Term  38w0d  2.58 kg (5 lb 11 oz) M CS-LTranv   SONIA   1 Term  39w0d  2.466 kg (5 lb 7 oz) M CS-LTranv   SONIA      Complications: Oligohydramnios       Family History   Problem Relation Age of Onset    No Known Problems Father     No Known Problems Mother     No Known Problems Brother     Breast cancer Maternal Aunt     Cancer Maternal Aunt         breast- postmenopausal    No Known Problems Son     Cancer Maternal Grandmother         mets to bone    Diabetes Neg Hx     Colon cancer Neg Hx        Social History     Tobacco Use    Smoking status: Never    Smokeless tobacco: Never   Substance Use Topics    Alcohol use: Yes     Comment: 2-3 days per week    Drug use: No       Physical Exam:  /60   Ht 5' 4" (1.626 m)   Wt 57.2 kg (126 lb 3.4 oz)   LMP 2023   BMI 21.66 kg/m²     APPEARANCE: Well nourished, well developed, in no acute distress.  AFFECT: WNL, alert and oriented x 3  SKIN: No acne or hirsutism  BREASTS: Symmetrical, no skin changes.                      No nipple discharge. "   No palpable masses bilaterally  NODES: No inguinal nor axillary LAD  ABDOMEN: soft Non tender Non distended No masses  PELVIC:   Normal external genitalia without lesions.  Normal hair distribution.   Adequate perineal body, normal urethral meatus.   No signif cystocele or rectocele.  Vagina moist and well rugated without lesions or discharge.    Cervix pink, without lesions, discharge or tenderness.     PAP not performed   Bimanual exam shows uterus to be normal size, regular, mobile and nontender.    Adnexa without masses or tenderness.    EXTREMITIES: No edema.        ASSESSMENT AND PLAN  Rakan was seen today for well woman.    Diagnoses and all orders for this visit:    Encounter for gynecological examination    Screening mammogram for breast cancer  -     Mammo Digital Screening Bilat w/ Vega; Future    Vaginal discharge.  -     drospirenone-estetrol (NEXTSTELLIS) 3 mg- 14.2 mg (28) Tab; Take 1 tablet by mouth Daily.      Patient was counseled today on A.C.S. Pap guidelines and recommendations for yearly pelvic exams, mammograms and monthly self breast exams; to see her PCP for other health maintenance.     Patient encouraged to register for portal and results will be sent via portal.       Health Maintenance   Topic Date Due    Hepatitis C Screening  Never done    TETANUS VACCINE  Never done    Mammogram  02/24/2023    Lipid Panel  Completed

## 2023-03-16 NOTE — PROGRESS NOTES
Pt resulted via portal  Labs are normal   looks like healed wound, slight maceration from moisture - appreciate WC input - WC as rec

## 2023-03-31 ENCOUNTER — APPOINTMENT (OUTPATIENT)
Dept: RADIOLOGY | Facility: OTHER | Age: 43
End: 2023-03-31
Attending: OBSTETRICS & GYNECOLOGY
Payer: COMMERCIAL

## 2023-03-31 VITALS — BODY MASS INDEX: 21.53 KG/M2 | WEIGHT: 126.13 LBS | HEIGHT: 64 IN

## 2023-03-31 DIAGNOSIS — Z12.31 SCREENING MAMMOGRAM FOR BREAST CANCER: ICD-10-CM

## 2023-03-31 PROCEDURE — 77063 BREAST TOMOSYNTHESIS BI: CPT | Mod: 26,,, | Performed by: RADIOLOGY

## 2023-03-31 PROCEDURE — 77063 MAMMO DIGITAL SCREENING BILAT WITH TOMO: ICD-10-PCS | Mod: 26,,, | Performed by: RADIOLOGY

## 2023-03-31 PROCEDURE — 77067 MAMMO DIGITAL SCREENING BILAT WITH TOMO: ICD-10-PCS | Mod: 26,,, | Performed by: RADIOLOGY

## 2023-03-31 PROCEDURE — 77067 SCR MAMMO BI INCL CAD: CPT | Mod: 26,,, | Performed by: RADIOLOGY

## 2023-03-31 PROCEDURE — 77067 SCR MAMMO BI INCL CAD: CPT | Mod: TC,PN

## 2023-04-11 NOTE — PROGRESS NOTES
Just wanted to let you know that I got your mammogram results back and the radiologist reading is perfectly normal. Let me know if you have any questions or concerns  Hope you have a great day!  Dr Schofield

## 2023-08-21 ENCOUNTER — PATIENT MESSAGE (OUTPATIENT)
Dept: OBSTETRICS AND GYNECOLOGY | Facility: CLINIC | Age: 43
End: 2023-08-21
Payer: COMMERCIAL

## 2023-08-21 RX ORDER — ALPRAZOLAM 1 MG/1
1 TABLET ORAL DAILY PRN
Qty: 8 TABLET | Refills: 0 | Status: SHIPPED | OUTPATIENT
Start: 2023-08-21

## 2023-11-03 ENCOUNTER — PATIENT MESSAGE (OUTPATIENT)
Dept: OBSTETRICS AND GYNECOLOGY | Facility: CLINIC | Age: 43
End: 2023-11-03
Payer: COMMERCIAL

## 2023-11-03 RX ORDER — FLUCONAZOLE 150 MG/1
150 TABLET ORAL DAILY
Qty: 2 TABLET | Refills: 0 | Status: SHIPPED | OUTPATIENT
Start: 2023-11-03 | End: 2023-11-05

## 2024-03-18 DIAGNOSIS — N89.8 VAGINAL DISCHARGE: ICD-10-CM

## 2024-03-18 RX ORDER — DROSPIRENONE AND ESTETROL 3-14.2(28)
1 KIT ORAL DAILY
Qty: 84 TABLET | Refills: 3 | Status: SHIPPED | OUTPATIENT
Start: 2024-03-18 | End: 2024-03-21 | Stop reason: SDUPTHER

## 2024-03-21 ENCOUNTER — OFFICE VISIT (OUTPATIENT)
Dept: OBSTETRICS AND GYNECOLOGY | Facility: CLINIC | Age: 44
End: 2024-03-21
Payer: COMMERCIAL

## 2024-03-21 VITALS — HEIGHT: 64 IN | BODY MASS INDEX: 21.07 KG/M2 | WEIGHT: 123.44 LBS

## 2024-03-21 DIAGNOSIS — Z12.31 BREAST CANCER SCREENING BY MAMMOGRAM: ICD-10-CM

## 2024-03-21 DIAGNOSIS — Z30.9 ENCOUNTER FOR CONTRACEPTIVE MANAGEMENT, UNSPECIFIED TYPE: ICD-10-CM

## 2024-03-21 DIAGNOSIS — Z01.419 ENCOUNTER FOR GYNECOLOGICAL EXAMINATION: Primary | ICD-10-CM

## 2024-03-21 PROCEDURE — 99396 PREV VISIT EST AGE 40-64: CPT | Mod: S$GLB,,, | Performed by: OBSTETRICS & GYNECOLOGY

## 2024-03-21 PROCEDURE — 1159F MED LIST DOCD IN RCRD: CPT | Mod: CPTII,S$GLB,, | Performed by: OBSTETRICS & GYNECOLOGY

## 2024-03-21 PROCEDURE — 3008F BODY MASS INDEX DOCD: CPT | Mod: CPTII,S$GLB,, | Performed by: OBSTETRICS & GYNECOLOGY

## 2024-03-21 PROCEDURE — 99999 PR PBB SHADOW E&M-EST. PATIENT-LVL III: CPT | Mod: PBBFAC,,, | Performed by: OBSTETRICS & GYNECOLOGY

## 2024-03-21 RX ORDER — DROSPIRENONE AND ESTETROL 3-14.2(28)
1 KIT ORAL DAILY
Qty: 84 TABLET | Refills: 3 | Status: SHIPPED | OUTPATIENT
Start: 2024-03-21

## 2024-03-21 RX ORDER — FLUOCINOLONE ACETONIDE 0.11 MG/ML
OIL AURICULAR (OTIC)
COMMUNITY

## 2024-03-21 NOTE — PROGRESS NOTES
LMP: Patient's last menstrual period was 03/09/2024 (exact date)..    Contraception: The current method of family planning is Nextstellis  Meds per MD: Nextstellis    Last Pap:  pap & hpv negative  Last MMG: 3- birads 1 [facility]  Last Colonoscopy: never

## 2024-03-21 NOTE — PROGRESS NOTES
"Chief Complaint: well woman exam  Annual Exam    She is established    Rakan Castro is a 44 y.o. female  presents for a well woman exam.    No c/o   Had 3 yeast infections this past yr - no sx now     ROS:  No abdominal pain. No vaginal discharge  No breast pain or masses, No rectal bleeding    LMP: Patient's last menstrual period was 2024 (exact date)..    Contraception: The current method of family planning is Nextstellis  Meds per MD: Nextstellis     Last Pap:  pap & hpv negative  Last MMG: 3- birads 1 [facility]  Last Colonoscopy: never         Past Medical History:   Diagnosis Date    Anxiety     xanax prn flying    Thrombocytopenia        Past Surgical History:   Procedure Laterality Date     SECTION      x 3       OB History    Para Term  AB Living   3 3 3     3   SAB IAB Ectopic Multiple Live Births           3      # Outcome Date GA Lbr Curry/2nd Weight Sex Delivery Anes PTL Lv   3 Term  38w0d  2.892 kg (6 lb 6 oz) M CS-LTranv   SONIA   2 Term  38w0d  2.58 kg (5 lb 11 oz) M CS-LTranv   SONIA   1 Term  39w0d  2.466 kg (5 lb 7 oz) M CS-LTranv   SONIA      Complications: Oligohydramnios       Family History   Problem Relation Age of Onset    Cancer Maternal Grandmother         mets to bone    No Known Problems Father     No Known Problems Mother     No Known Problems Brother     No Known Problems Son     Breast cancer Maternal Aunt     Cancer Maternal Aunt         breast- postmenopausal    Diabetes Neg Hx     Colon cancer Neg Hx        Social History     Tobacco Use    Smoking status: Never    Smokeless tobacco: Never   Substance Use Topics    Alcohol use: Yes     Comment: 2-3 days per week    Drug use: No           Physical Exam:  Ht 5' 4" (1.626 m)   Wt 56 kg (123 lb 7.3 oz)   LMP 2024 (Exact Date)   BMI 21.19 kg/m²     APPEARANCE: Well nourished, well developed, in no acute distress.  AFFECT: WNL, alert and oriented x 3  SKIN: No acne or " hirsutism  BREASTS: Symmetrical, no skin changes.                      No nipple discharge.   No palpable masses bilaterally  NODES: No inguinal nor axillary LAD  ABDOMEN: soft Non tender Non distended No masses  PELVIC:   Normal external genitalia without lesions.  Normal hair distribution.   Adequate perineal body, normal urethral meatus.   No signif cystocele or rectocele.  Vagina moist and well rugated without lesions or discharge.    Cervix pink, without lesions, discharge or tenderness.     PAP not performed   Bimanual exam shows uterus to be normal size, regular, mobile and nontender.    Adnexa without masses or tenderness.    EXTREMITIES: No edema.        ASSESSMENT AND PLAN  Rakan was seen today for annual exam.    Diagnoses and all orders for this visit:    Encounter for gynecological examination  -     drospirenone-estetrol (NEXTSTELLIS) 3 mg- 14.2 mg (28) Tab; Take 1 tablet by mouth Daily.    Breast cancer screening by mammogram  -     Mammo Digital Screening Bilat w/ Vega; Future          Patient was counseled today on A.C.S. Pap guidelines and recommendations for yearly pelvic exams, mammograms and monthly self breast exams; to see her PCP for other health maintenance.     Patient encouraged to register for portal and results will be sent via portal.       Health Maintenance   Topic Date Due    Hepatitis C Screening  Never done    TETANUS VACCINE  Never done    Colorectal Cancer Screening  Never done    Mammogram  03/31/2025    Lipid Panel  02/03/2026

## 2024-04-25 ENCOUNTER — HOSPITAL ENCOUNTER (OUTPATIENT)
Dept: RADIOLOGY | Facility: HOSPITAL | Age: 44
Discharge: HOME | End: 2024-04-25
Attending: OBSTETRICS & GYNECOLOGY
Payer: COMMERCIAL

## 2024-04-25 VITALS — BODY MASS INDEX: 21 KG/M2 | WEIGHT: 123 LBS | HEIGHT: 64 IN

## 2024-04-25 DIAGNOSIS — Z12.31 BREAST CANCER SCREENING BY MAMMOGRAM: ICD-10-CM

## 2024-04-25 PROCEDURE — 77067 SCR MAMMO BI INCL CAD: CPT | Mod: 26,,, | Performed by: RADIOLOGY

## 2024-04-25 PROCEDURE — 77063 BREAST TOMOSYNTHESIS BI: CPT | Mod: TC

## 2024-04-25 PROCEDURE — 77063 BREAST TOMOSYNTHESIS BI: CPT | Mod: 26,,, | Performed by: RADIOLOGY

## 2024-06-28 ENCOUNTER — PATIENT MESSAGE (OUTPATIENT)
Dept: OBSTETRICS AND GYNECOLOGY | Facility: CLINIC | Age: 44
End: 2024-06-28
Payer: COMMERCIAL

## 2024-07-03 ENCOUNTER — OFFICE VISIT (OUTPATIENT)
Dept: OBSTETRICS AND GYNECOLOGY | Facility: CLINIC | Age: 44
End: 2024-07-03
Payer: COMMERCIAL

## 2024-07-03 DIAGNOSIS — I80.9 SUPERFICIAL THROMBOPHLEBITIS, INVOLVING UNSPECIFIED SITE: Primary | ICD-10-CM

## 2024-07-03 PROCEDURE — 99213 OFFICE O/P EST LOW 20 MIN: CPT | Mod: 95,,, | Performed by: OBSTETRICS & GYNECOLOGY

## 2024-07-11 ENCOUNTER — LAB VISIT (OUTPATIENT)
Dept: LAB | Facility: HOSPITAL | Age: 44
End: 2024-07-11
Attending: OBSTETRICS & GYNECOLOGY
Payer: COMMERCIAL

## 2024-07-11 DIAGNOSIS — I80.9 SUPERFICIAL THROMBOPHLEBITIS, INVOLVING UNSPECIFIED SITE: ICD-10-CM

## 2024-07-11 LAB
ALBUMIN SERPL BCP-MCNC: 3.9 G/DL (ref 3.5–5.2)
ALP SERPL-CCNC: 45 U/L (ref 55–135)
ALT SERPL W/O P-5'-P-CCNC: 14 U/L (ref 10–44)
ANION GAP SERPL CALC-SCNC: 7 MMOL/L (ref 8–16)
AST SERPL-CCNC: 23 U/L (ref 10–40)
AT III ACT/NOR PPP CHRO: 110 % (ref 83–118)
BASOPHILS # BLD AUTO: 0.04 K/UL (ref 0–0.2)
BASOPHILS NFR BLD: 0.8 % (ref 0–1.9)
BILIRUB SERPL-MCNC: 0.5 MG/DL (ref 0.1–1)
BUN SERPL-MCNC: 10 MG/DL (ref 6–20)
CALCIUM SERPL-MCNC: 9.2 MG/DL (ref 8.7–10.5)
CHLORIDE SERPL-SCNC: 104 MMOL/L (ref 95–110)
CO2 SERPL-SCNC: 28 MMOL/L (ref 23–29)
CREAT SERPL-MCNC: 0.8 MG/DL (ref 0.5–1.4)
DIFFERENTIAL METHOD BLD: NORMAL
EOSINOPHIL # BLD AUTO: 0.2 K/UL (ref 0–0.5)
EOSINOPHIL NFR BLD: 4.1 % (ref 0–8)
ERYTHROCYTE [DISTWIDTH] IN BLOOD BY AUTOMATED COUNT: 12.5 % (ref 11.5–14.5)
EST. GFR  (NO RACE VARIABLE): >60 ML/MIN/1.73 M^2
GLUCOSE SERPL-MCNC: 77 MG/DL (ref 70–110)
HCT VFR BLD AUTO: 40.4 % (ref 37–48.5)
HCYS SERPL-SCNC: 11.1 UMOL/L (ref 4–15.5)
HGB BLD-MCNC: 13.5 G/DL (ref 12–16)
IMM GRANULOCYTES # BLD AUTO: 0.02 K/UL (ref 0–0.04)
IMM GRANULOCYTES NFR BLD AUTO: 0.4 % (ref 0–0.5)
LYMPHOCYTES # BLD AUTO: 1.9 K/UL (ref 1–4.8)
LYMPHOCYTES NFR BLD: 39 % (ref 18–48)
MCH RBC QN AUTO: 30.5 PG (ref 27–31)
MCHC RBC AUTO-ENTMCNC: 33.4 G/DL (ref 32–36)
MCV RBC AUTO: 91 FL (ref 82–98)
MONOCYTES # BLD AUTO: 0.3 K/UL (ref 0.3–1)
MONOCYTES NFR BLD: 5.8 % (ref 4–15)
NEUTROPHILS # BLD AUTO: 2.4 K/UL (ref 1.8–7.7)
NEUTROPHILS NFR BLD: 49.9 % (ref 38–73)
NRBC BLD-RTO: 0 /100 WBC
PLATELET # BLD AUTO: 174 K/UL (ref 150–450)
PMV BLD AUTO: 11.3 FL (ref 9.2–12.9)
POTASSIUM SERPL-SCNC: 4 MMOL/L (ref 3.5–5.1)
PROT SERPL-MCNC: 6.8 G/DL (ref 6–8.4)
RBC # BLD AUTO: 4.42 M/UL (ref 4–5.4)
SODIUM SERPL-SCNC: 139 MMOL/L (ref 136–145)
WBC # BLD AUTO: 4.82 K/UL (ref 3.9–12.7)

## 2024-07-11 PROCEDURE — 86147 CARDIOLIPIN ANTIBODY EA IG: CPT | Performed by: OBSTETRICS & GYNECOLOGY

## 2024-07-11 PROCEDURE — 83090 ASSAY OF HOMOCYSTEINE: CPT | Performed by: OBSTETRICS & GYNECOLOGY

## 2024-07-11 PROCEDURE — 81240 F2 GENE: CPT | Performed by: OBSTETRICS & GYNECOLOGY

## 2024-07-11 PROCEDURE — 85300 ANTITHROMBIN III ACTIVITY: CPT | Performed by: OBSTETRICS & GYNECOLOGY

## 2024-07-11 PROCEDURE — 86038 ANTINUCLEAR ANTIBODIES: CPT | Performed by: OBSTETRICS & GYNECOLOGY

## 2024-07-11 PROCEDURE — 85306 CLOT INHIBIT PROT S FREE: CPT | Performed by: OBSTETRICS & GYNECOLOGY

## 2024-07-11 PROCEDURE — 36415 COLL VENOUS BLD VENIPUNCTURE: CPT | Performed by: OBSTETRICS & GYNECOLOGY

## 2024-07-11 PROCEDURE — 81241 F5 GENE: CPT | Performed by: OBSTETRICS & GYNECOLOGY

## 2024-07-11 PROCEDURE — 80053 COMPREHEN METABOLIC PANEL: CPT | Performed by: OBSTETRICS & GYNECOLOGY

## 2024-07-11 PROCEDURE — 85730 THROMBOPLASTIN TIME PARTIAL: CPT | Performed by: OBSTETRICS & GYNECOLOGY

## 2024-07-11 PROCEDURE — 85025 COMPLETE CBC W/AUTO DIFF WBC: CPT | Performed by: OBSTETRICS & GYNECOLOGY

## 2024-07-11 PROCEDURE — 85307 ASSAY ACTIVATED PROTEIN C: CPT | Performed by: OBSTETRICS & GYNECOLOGY

## 2024-07-12 LAB
ANA SER QL IF: NORMAL
APC INTERPRETATION: ABNORMAL
APCR PPP: 1.7 {RATIO}
PROT S ACT/NOR PPP: 91 % (ref 50–150)

## 2024-07-13 LAB
CONFIRM DRVVT STA-STACLOT: NORMAL S
DRVVT SCREEN TO CONFIRM RATIO: NORMAL {RATIO}
HEPARIN NT PPP QL: NORMAL
LA 3 SCREEN W REFLEX-IMP: NORMAL
LMW HEPARIN IND PLT AB SER QL: NORMAL
MIXING DRVVT/NORMAL: NORMAL %
NEUTRALIZED DRVVT SCREEN RATIO: NORMAL
PROTHROMBIN TIME: 12.4 S (ref 12–15.5)
SCREEN APTT/NORMAL: 0.85
SCREEN APTT/NORMAL: NORMAL
SCREEN DRVVT/NORMAL: 0.89 %
THROMBIN TIME: NORMAL S

## 2024-07-15 LAB
CARDIOLIPIN IGG SER IA-ACNC: <9.4 GPL (ref 0–14.99)
CARDIOLIPIN IGM SER IA-ACNC: <9.4 MPL (ref 0–12.49)
F2 C.20210G>A GENO BLD/T: NEGATIVE
F5 P.R506Q BLD/T QL: ABNORMAL

## 2024-07-17 ENCOUNTER — PATIENT MESSAGE (OUTPATIENT)
Dept: OBSTETRICS AND GYNECOLOGY | Facility: HOSPITAL | Age: 44
End: 2024-07-17
Payer: COMMERCIAL

## 2024-07-17 DIAGNOSIS — I80.9 SUPERFICIAL THROMBOPHLEBITIS, INVOLVING UNSPECIFIED SITE: Primary | ICD-10-CM

## 2024-07-17 DIAGNOSIS — D68.51 FACTOR V LEIDEN CARRIER: ICD-10-CM

## 2024-07-17 NOTE — PROGRESS NOTES
Spoke to patient at length.  Heterozygous for factor 5 Leiden and protein C is low.    No action needed   No OCP  Patient with superficial thrombophlebitis.  Will refer to heme Onc for evaluation and long-term management regarding risk benefit and future management.  Order placed

## 2024-07-27 NOTE — PROGRESS NOTES
"Subjective:       Patient ID: Rakan Castro is a 44 y.o. female.    Chief Complaint: " found out last week that I have a blood clot (non DVT) in my leg "    History of Present Illness  45 y/o with superficial thrombophlebitis was on Nextellis   Talked today about pro and con and risk and benefit of OCP with superficial thrombophlebitis and given her age I would stop OCP         OB History    Para Term  AB Living   3 3 3     3   SAB IAB Ectopic Multiple Live Births           3      # Outcome Date GA Lbr Curry/2nd Weight Sex Type Anes PTL Lv   3 Term  38w0d  2.892 kg (6 lb 6 oz) M CS-LTranv   SONIA   2 Term  38w0d  2.58 kg (5 lb 11 oz) M CS-LTranv   SONIA   1 Term  39w0d  2.466 kg (5 lb 7 oz) M CS-LTranv   SONIA      Complications: Oligohydramnios        Objective:     There were no vitals filed for this visit.    Physical Exam  Deferred      Assessment/ Plan:     Orders Placed This Encounter    APC Resistance    Protein S Activity    Factor 5 leiden    Cardiolipin antibody    DRVVT    Antithrombin III    Prothrombin [id number] Mutation    Homocysteine, Serum    RAI Screen w/Reflex    CBC Auto Differential    Comprehensive Metabolic Panel       Diagnoses and all orders for this visit:    Superficial thrombophlebitis, involving unspecified site  Stop OCP   Check labs to see if there is genetic reason to have clotting  Rec baby ASA    -     APC Resistance; Future  -     Protein S Activity; Future  -     Factor 5 leiden; Future  -     Cardiolipin antibody; Future  -     DRVVT; Future  -     Antithrombin III; Future  -     Prothrombin E07370G Mutation; Future  -     Homocysteine, Serum; Future  -     RAI Screen w/Reflex; Future  -     CBC Auto Differential; Future  -     Comprehensive Metabolic Panel; Future        Follow up if symptoms worsen or fail to improve.      Health Maintenance         Date Due Completion Date    Hepatitis C Screening Never done ---    HIV Screening Never done ---    TETANUS " VACCINE Never done ---    COVID-19 Vaccine (3 - 2023-24 season) 09/01/2023 8/10/2021    Colorectal Cancer Screening Never done ---    Influenza Vaccine (1) 09/01/2024 10/10/2018    Cervical Cancer Screening 11/04/2024 11/4/2021    Lipid Panel 02/03/2026 2/3/2021    Mammogram 04/25/2026 4/25/2024

## 2024-08-13 ENCOUNTER — OFFICE VISIT (OUTPATIENT)
Dept: HEMATOLOGY/ONCOLOGY | Facility: CLINIC | Age: 44
End: 2024-08-13
Payer: COMMERCIAL

## 2024-08-13 DIAGNOSIS — D68.51 FACTOR V LEIDEN CARRIER: ICD-10-CM

## 2024-08-13 DIAGNOSIS — I80.9 SUPERFICIAL THROMBOPHLEBITIS, INVOLVING UNSPECIFIED SITE: ICD-10-CM

## 2024-08-13 PROCEDURE — 99202 OFFICE O/P NEW SF 15 MIN: CPT | Mod: 95,,, | Performed by: INTERNAL MEDICINE

## 2024-08-13 NOTE — PROGRESS NOTES
PATIENT: Rakan Castro  MRN: [id number]  DATE: 2024    Diagnosis:   1. Superficial thrombophlebitis, involving unspecified site    2. Factor V Leiden carrier        Chief Complaint: thrombophlebitis    The patient location is: [place]  The chief complaint leading to consultation is: thrombophlebitis    Visit type: audiovisual    Face to Face time with patient: 12 min    Each patient to whom he or she provides medical services by telemedicine is:  (1) informed of the relationship between the physician and patient and the respective role of any other health care provider with respect to management of the patient; and (2) notified that he or she may decline to receive medical services by telemedicine and may withdraw from such care at any time.    Notes:        Subjective:      History of Present Illness: Ms. Castro is a 44 y.o. female who participates remotely in telemedicine follow-up today.   She notes she was travelling in  and noticed some tightness and pain in the leg. She was subsequently diagnosed with superficial thrombophlebitis. She was on birth control (nextellis). Now off OCP altogether.   She was started on baby aspirin once daily. She is no longer having swelling or pain. She does note that her father had clots. No prior history of thrombotic issues.     Past medical, surgical, family, and social histories have been reviewed and updated below.    Past Medical History:   Past Medical History:   Diagnosis Date    Anxiety     xanax prn flying    Thrombocytopenia        Past Surgical History:   Past Surgical History:   Procedure Laterality Date     SECTION      x 3       Family History:   Family History   Problem Relation Name Age of Onset    Cancer Maternal Grandmother          mets to bone    No Known Problems Father      No Known Problems Mother      No Known Problems Brother      No Known Problems Son      Breast cancer Maternal Aunt      Cancer Maternal Aunt          breast-  postmenopausal    Diabetes Neg Hx      Colon cancer Neg Hx         Social History:  reports that she has never smoked. She has never used smokeless tobacco. She reports current alcohol use. She reports that she does not use drugs.    Allergies:  Review of patient's allergies indicates:   Allergen Reactions    Bactrim [sulfamethoxazole-trimethoprim] Hives       Medications:  Current Outpatient Medications   Medication Sig Dispense Refill    ALPRAZolam (XANAX) 1 MG tablet Take 1 tablet (1 mg total) by mouth daily as needed for Anxiety. 8 tablet 0    ALTRENO 0.05 % Lotn Apply topically.      ciprofloxacin-dexAMETHasone 0.3-0.1% (CIPRODEX) 0.3-0.1 % DrpS ciprofloxacin 0.3 %-dexamethasone 0.1 % ear drops,suspension      drospirenone-estetrol (NEXTSTELLIS) 3 mg- 14.2 mg (28) Tab Take 1 tablet by mouth Daily. 84 tablet 3    fluocinolone acetonide oiL 0.01 % Drop INSTILL 2 DROPS TO AFFECTED EAR TWICE DAILY AS NEEDED FOR ITCHING OR ECZEMA      multivitamin capsule Take 1 capsule by mouth once daily.       No current facility-administered medications for this visit.       Review of Systems  A comprehensive review of systems was performed; pertinent positives and negatives are noted in the HPI.        Objective:      Vitals: There were no vitals filed for this visit.  BMI: There is no height or weight on file to calculate BMI.    Physical Exam  No physical exam due to remote nature of the visit.      Laboratory Data:  Labs have been reviewed.      Assessment/Plan:       1. Superficial thrombophlebitis, involving unspecified site    2. Factor V Leiden carrier      Very pleasant 45yo woman who recently experienced an episode of thrombophlebitis. She was on estrogen-containing oral contraceptives at the time. Further evaluation notable for FVL heterozygosity. No prior history of VTE.  No further evaluation or treatment necessary at this time.  Patient should be considered at increased risk for VTE however given that she has never  had a VTE, no particular intervention is necessary at this time.   Advised to reach out if she has questions about future questions related to clotting risk, especially as it relates to any elective surgical procedures in the future as she likely ought to have prophylactic anticoagulation perioperatively for higher-risk procedures. See below from [employer]:    Procedure-related - Many procedure-related factors contribute to the risk of VTE in nonorthopedic patients including the extent and duration of surgery, intraoperative positioning, the type of anesthesia, and postoperative mobility. In general, the highest risk is in those undergoing major surgery (defined as surgery lasting longer than 45 minutes [13]), abdominal and thoracic cavity surgery (eg, major abdominal/pelvic or surgery for malignancy), prolonged surgery (?2 hours), emergency rather than elective surgery, postoperative immobilization for ?4 days, as well as critically ill patients who are confined to bed (eg, extensive burns, multiple trauma, brain/spine injury) [13-16]. The risk is generally low for patients undergoing minor, typically ambulatory procedures (eg, elective hernia repair, thyroid surgery, minor skin excision, carotid endarterectomy).        Jasbir Cox MD, FACP  Hematology/Oncology  [facility] [address], [address]  [place] [address]  Phone: [phone]  Fax: [phone]

## 2024-10-15 ENCOUNTER — TELEPHONE (OUTPATIENT)
Dept: PRIMARY CARE CLINIC | Facility: CLINIC | Age: 44
End: 2024-10-15
Payer: COMMERCIAL

## 2024-10-15 NOTE — TELEPHONE ENCOUNTER
Spoke to patient re; lab work. Pt has a new patient appointment tomorrow with Dr.Rebecca Paulino MD, and wanted to know if she needed labs before appointment.    I advised patient that the physician does not order labs for new patients prior to the visit, and that if labs were needed, then that will be discussed tomorrow during the visit.     Patient also wanted Dr. Bob look at her previous bloodwork she had done in July 11, 2024. If she needed to have more, I advised patient that  will review her chart/labs before the visit.    Pt verbalized understanding and all questions were answered.

## 2024-10-15 NOTE — TELEPHONE ENCOUNTER
----- Message from Christian sent at 10/15/2024  9:41 AM CDT -----  Regarding: Previous Bloodwork  Contact: Pt [phone]  [email or web address]     Patient is calling for Medical Advice regarding: Patient wanted to know if she needed to get bloodwork for upcoming visit and if so, could Dr. Bob look at her previous bloodwork she had done in July 11, 2024. If she needed to have more, please contact patient to inform. She wanted Dr. Bob to be aware of the bloodwork she had done previously before getting more.    How long has patient had these symptoms:    Pharmacy name and phone#:    Patient wants a call back or thru [facility]: Call    Comments:    Please advise patient replies from provider may take up to 48 hours.

## 2024-10-16 ENCOUNTER — OFFICE VISIT (OUTPATIENT)
Dept: PRIMARY CARE CLINIC | Facility: CLINIC | Age: 44
End: 2024-10-16
Payer: COMMERCIAL

## 2024-10-16 ENCOUNTER — LAB VISIT (OUTPATIENT)
Dept: LAB | Facility: HOSPITAL | Age: 44
End: 2024-10-16
Attending: FAMILY MEDICINE
Payer: COMMERCIAL

## 2024-10-16 VITALS
WEIGHT: 130.31 LBS | HEART RATE: 74 BPM | SYSTOLIC BLOOD PRESSURE: 102 MMHG | DIASTOLIC BLOOD PRESSURE: 74 MMHG | OXYGEN SATURATION: 96 % | RESPIRATION RATE: 14 BRPM | BODY MASS INDEX: 22.25 KG/M2 | HEIGHT: 64 IN

## 2024-10-16 DIAGNOSIS — Z13.220 ENCOUNTER FOR LIPID SCREENING FOR CARDIOVASCULAR DISEASE: ICD-10-CM

## 2024-10-16 DIAGNOSIS — Z13.6 ENCOUNTER FOR LIPID SCREENING FOR CARDIOVASCULAR DISEASE: ICD-10-CM

## 2024-10-16 DIAGNOSIS — Z12.11 SCREEN FOR COLON CANCER: ICD-10-CM

## 2024-10-16 DIAGNOSIS — F40.243 ANXIETY WITH FLYING: ICD-10-CM

## 2024-10-16 DIAGNOSIS — Z00.00 ANNUAL PHYSICAL EXAM: ICD-10-CM

## 2024-10-16 DIAGNOSIS — Z00.00 ANNUAL PHYSICAL EXAM: Primary | ICD-10-CM

## 2024-10-16 LAB
CHOLEST SERPL-MCNC: 185 MG/DL (ref 120–199)
CHOLEST/HDLC SERPL: 2.4 {RATIO} (ref 2–5)
ESTIMATED AVG GLUCOSE: 88 MG/DL (ref 68–131)
HBA1C MFR BLD: 4.7 % (ref 4–5.6)
HDLC SERPL-MCNC: 76 MG/DL (ref 40–75)
HDLC SERPL: 41.1 % (ref 20–50)
LDLC SERPL CALC-MCNC: 100 MG/DL (ref 63–159)
NONHDLC SERPL-MCNC: 109 MG/DL
TRIGL SERPL-MCNC: 45 MG/DL (ref 30–150)
TSH SERPL DL<=0.005 MIU/L-ACNC: 0.8 UIU/ML (ref 0.4–4)

## 2024-10-16 PROCEDURE — 80061 LIPID PANEL: CPT | Performed by: FAMILY MEDICINE

## 2024-10-16 PROCEDURE — 3008F BODY MASS INDEX DOCD: CPT | Mod: CPTII,S$GLB,, | Performed by: FAMILY MEDICINE

## 2024-10-16 PROCEDURE — 1159F MED LIST DOCD IN RCRD: CPT | Mod: CPTII,S$GLB,, | Performed by: FAMILY MEDICINE

## 2024-10-16 PROCEDURE — 1160F RVW MEDS BY RX/DR IN RCRD: CPT | Mod: CPTII,S$GLB,, | Performed by: FAMILY MEDICINE

## 2024-10-16 PROCEDURE — 83036 HEMOGLOBIN GLYCOSYLATED A1C: CPT | Performed by: FAMILY MEDICINE

## 2024-10-16 PROCEDURE — 99999 PR PBB SHADOW E&M-EST. PATIENT-LVL V: CPT | Mod: PBBFAC,,, | Performed by: FAMILY MEDICINE

## 2024-10-16 PROCEDURE — 3078F DIAST BP <80 MM HG: CPT | Mod: CPTII,S$GLB,, | Performed by: FAMILY MEDICINE

## 2024-10-16 PROCEDURE — 99396 PREV VISIT EST AGE 40-64: CPT | Mod: S$GLB,,, | Performed by: FAMILY MEDICINE

## 2024-10-16 PROCEDURE — 3074F SYST BP LT 130 MM HG: CPT | Mod: CPTII,S$GLB,, | Performed by: FAMILY MEDICINE

## 2024-10-16 PROCEDURE — 84443 ASSAY THYROID STIM HORMONE: CPT | Performed by: FAMILY MEDICINE

## 2024-10-16 RX ORDER — ALPRAZOLAM 1 MG/1
1 TABLET ORAL DAILY PRN
Qty: 6 TABLET | Refills: 0 | Status: SHIPPED | OUTPATIENT
Start: 2024-10-16 | End: 2024-11-15

## 2024-10-16 NOTE — PROGRESS NOTES
"Subjective:       Patient ID: Rakan Castro is a 44 y.o. female.    Chief Complaint: Establish Care    HPI 43 y/o female here for annual exam and to establish care.     She is feeling good in general, over the past 2 months she has had nasal congestion, sneezing, pnd, itchy watery eyes, she denies sinus pressure/ear pain, she has not tried any meds, she denies f/n/v/heartburn/d/constipation/cp/sob/urinary sx. She is active and does weights, walking, biking, pilates 5-6 days a week. She is trying to eat healthy. Sleeping ok. Mood ok.     Hx of Covid 2020, 2021  Hx of superficial thrombophlebitis lower R leg, Factor V leiden carrier  BRITTON: on medication in college, took Paxil but was not helpful, taking xanax mainly for travel  Derm following with Dr. Mccracken; topical retinol  ENT Dr. Luna Patricia, gets otitis externa  Colon cancer screening due, willing Cologuard  GYN: following with Dr. Schofield, pelvic utd, mmg 4/2024  Eye exam due  Dental utd  Declined tdap today    Review of Systems  see HPI  Objective:      /74 (BP Location: Right arm, Patient Position: Sitting)   Pulse 74   Resp 14   Ht 5' 4" (1.626 m)   Wt 59.1 kg (130 lb 4.7 oz)   LMP 10/04/2024   SpO2 96%   BMI 22.36 kg/m²   Physical Exam  Vitals and nursing note reviewed.   Constitutional:       Appearance: She is well-developed.   HENT:      Head: Normocephalic and atraumatic.      Right Ear: Tympanic membrane normal.      Left Ear: Tympanic membrane normal.      Mouth/Throat:      Pharynx: No oropharyngeal exudate or posterior oropharyngeal erythema.   Neck:      Thyroid: No thyromegaly.   Cardiovascular:      Rate and Rhythm: Normal rate and regular rhythm.      Heart sounds: Normal heart sounds.   Pulmonary:      Effort: Pulmonary effort is normal. No respiratory distress.      Breath sounds: Normal breath sounds.   Abdominal:      General: Bowel sounds are normal. There is no distension.      Palpations: Abdomen is soft. There is " no mass.      Tenderness: There is no abdominal tenderness.   Musculoskeletal:      Cervical back: Normal range of motion and neck supple.      Right lower leg: No edema.      Left lower leg: No edema.   Lymphadenopathy:      Cervical: No cervical adenopathy.   Skin:     General: Skin is warm and dry.   Neurological:      Mental Status: She is alert.         Assessment:       1. Annual physical exam    2. Screen for colon cancer    3. Encounter for lipid screening for cardiovascular disease    4. Anxiety with flying        Plan:   Rakan was seen today for establish care.    Diagnoses and all orders for this visit:    Annual physical exam  -     Ambulatory referral/consult to Optometry; Future  -     Lipid Panel; Future  -     Hemoglobin A1C; Future  -     TSH; Future    Screen for colon cancer  -     Cologuard Screening (Multitarget Stool DNA); Future  -     Cologuard Screening (Multitarget Stool DNA)  -     Ambulatory referral/consult to Endo Procedure ; Future    Encounter for lipid screening for cardiovascular disease  -     Lipid Panel; Future    Anxiety with flying  -     ALPRAZolam (XANAX) 1 MG tablet; Take 1 tablet (1 mg total) by mouth daily as needed for Anxiety.

## 2024-10-16 NOTE — PATIENT INSTRUCTIONS
Can try Allegra or Claritin both are non drowsy, at night you could try Xyzal or Zyrtec    Flonase nasal spray and or Astlein nasal spray

## 2025-01-09 ENCOUNTER — CLINICAL SUPPORT (OUTPATIENT)
Dept: ENDOSCOPY | Facility: HOSPITAL | Age: 45
End: 2025-01-09
Attending: FAMILY MEDICINE
Payer: COMMERCIAL

## 2025-01-09 ENCOUNTER — TELEPHONE (OUTPATIENT)
Dept: ENDOSCOPY | Facility: HOSPITAL | Age: 45
End: 2025-01-09

## 2025-01-09 VITALS — HEIGHT: 64 IN | WEIGHT: 124 LBS | BODY MASS INDEX: 21.17 KG/M2

## 2025-01-09 DIAGNOSIS — Z12.11 SCREEN FOR COLON CANCER: ICD-10-CM

## 2025-01-09 NOTE — PLAN OF CARE
Spoke to patient to try to schedule her Colonoscopy.  She stated she would like her procedure on the week of March 26th.  At this time the March calendar is not open.  Patient apologized and stated she has too much to do for January and February.  She would also like the [facility] for her prep.

## 2025-01-09 NOTE — TELEPHONE ENCOUNTER
Spoke to patient to try to schedule her Colonoscopy.  She stated she would like her procedure on the week of March 26th.  At this time the March calendar is not open.  Patient apologized and stated she has too much to do for January and February.  She would also like the [facility] for her prep.

## 2025-02-11 ENCOUNTER — OFFICE VISIT (OUTPATIENT)
Dept: OPTOMETRY | Facility: CLINIC | Age: 45
End: 2025-02-11
Payer: COMMERCIAL

## 2025-02-11 DIAGNOSIS — H52.4 PRESBYOPIA OF BOTH EYES: ICD-10-CM

## 2025-02-11 PROCEDURE — 99999 PR PBB SHADOW E&M-EST. PATIENT-LVL III: CPT | Mod: PBBFAC,,, | Performed by: OPTOMETRIST

## 2025-02-11 PROCEDURE — 1159F MED LIST DOCD IN RCRD: CPT | Mod: CPTII,S$GLB,, | Performed by: OPTOMETRIST

## 2025-02-11 PROCEDURE — 92015 DETERMINE REFRACTIVE STATE: CPT | Mod: S$GLB,,, | Performed by: OPTOMETRIST

## 2025-02-11 PROCEDURE — 92004 COMPRE OPH EXAM NEW PT 1/>: CPT | Mod: S$GLB,,, | Performed by: OPTOMETRIST

## 2025-02-11 NOTE — PROGRESS NOTES
HPI    Pt is here today for routine eye exam. Denies pain/discomfort.  DLS: 1980's  (-)Flashes   (+)Floaters   (+)Diplopia   (+)Headaches   (-)Itching   (-)Tearing  (-)Burning  (-)Dryness   (-)Photophobia  (-)Glare   (+)Blurred [facility]  Past Eye Sx: (-)  Eye Meds: Lumify PRN OU    Last edited by Shania Rowe, OD on 2/11/2025 11:04 AM.            Assessment /Plan     For exam results, see Encounter Report.    Presbyopia of both eyes  -     Ambulatory referral/consult to Optometry      Continue use of OTC reading glasses prn (+0.75/+1.00). Monitor yearly.      RTC in 1 year for annual eye exam unless changes noted sooner.

## 2025-02-18 ENCOUNTER — RESULTS FOLLOW-UP (OUTPATIENT)
Dept: PRIMARY CARE CLINIC | Facility: CLINIC | Age: 45
End: 2025-02-18

## 2025-02-18 RX ORDER — PROMETHAZINE HYDROCHLORIDE AND DEXTROMETHORPHAN HYDROBROMIDE 6.25; 15 MG/5ML; MG/5ML
SYRUP ORAL
COMMUNITY
Start: 2025-02-17

## 2025-02-18 RX ORDER — FLUTICASONE PROPIONATE 50 MCG
1 SPRAY, SUSPENSION (ML) NASAL
COMMUNITY
Start: 2025-02-17

## 2025-02-18 RX ORDER — DEXBROMPHENIRAMINE MALEATE, PHENYLEPHRINE HYDROCHLORIDE 2; 7.5 MG/1; MG/1
1 TABLET ORAL EVERY 6 HOURS PRN
COMMUNITY
Start: 2025-02-17

## 2025-02-18 RX ORDER — BENZONATATE 100 MG/1
CAPSULE ORAL
COMMUNITY
Start: 2025-02-17

## 2025-02-19 ENCOUNTER — OFFICE VISIT (OUTPATIENT)
Dept: PRIMARY CARE CLINIC | Facility: CLINIC | Age: 45
End: 2025-02-19
Payer: COMMERCIAL

## 2025-02-19 VITALS
HEIGHT: 64 IN | SYSTOLIC BLOOD PRESSURE: 118 MMHG | WEIGHT: 131.81 LBS | OXYGEN SATURATION: 98 % | HEART RATE: 88 BPM | BODY MASS INDEX: 22.5 KG/M2 | DIASTOLIC BLOOD PRESSURE: 76 MMHG | TEMPERATURE: 98 F

## 2025-02-19 DIAGNOSIS — Z00.00 ANNUAL PHYSICAL EXAM: ICD-10-CM

## 2025-02-19 DIAGNOSIS — Z12.31 SCREENING MAMMOGRAM FOR BREAST CANCER: ICD-10-CM

## 2025-02-19 DIAGNOSIS — R05.1 ACUTE COUGH: ICD-10-CM

## 2025-02-19 DIAGNOSIS — Z13.220 ENCOUNTER FOR LIPID SCREENING FOR CARDIOVASCULAR DISEASE: ICD-10-CM

## 2025-02-19 DIAGNOSIS — J40 BRONCHITIS: Primary | ICD-10-CM

## 2025-02-19 DIAGNOSIS — Z13.6 ENCOUNTER FOR LIPID SCREENING FOR CARDIOVASCULAR DISEASE: ICD-10-CM

## 2025-02-19 LAB
CTP QC/QA: YES
CTP QC/QA: YES
POC MOLECULAR INFLUENZA A AGN: NEGATIVE
POC MOLECULAR INFLUENZA B AGN: NEGATIVE
SARS-COV-2 RDRP RESP QL NAA+PROBE: NEGATIVE

## 2025-02-19 RX ORDER — AMOXICILLIN AND CLAVULANATE POTASSIUM 875; 125 MG/1; MG/1
1 TABLET, FILM COATED ORAL EVERY 12 HOURS
Qty: 20 TABLET | Refills: 0 | Status: SHIPPED | OUTPATIENT
Start: 2025-02-19 | End: 2025-03-01

## 2025-02-19 RX ORDER — ALBUTEROL SULFATE 0.83 MG/ML
2.5 SOLUTION RESPIRATORY (INHALATION)
Status: COMPLETED | OUTPATIENT
Start: 2025-02-19 | End: 2025-02-19

## 2025-02-19 RX ORDER — ALBUTEROL SULFATE 90 UG/1
2 INHALANT RESPIRATORY (INHALATION) EVERY 6 HOURS PRN
Qty: 18 G | Refills: 0 | Status: SHIPPED | OUTPATIENT
Start: 2025-02-19 | End: 2026-02-19

## 2025-02-19 RX ORDER — PREDNISONE 20 MG/1
40 TABLET ORAL DAILY
Qty: 6 TABLET | Refills: 0 | Status: SHIPPED | OUTPATIENT
Start: 2025-02-19 | End: 2025-02-22

## 2025-02-19 RX ORDER — ALBUTEROL SULFATE 0.63 MG/3ML
0.63 SOLUTION RESPIRATORY (INHALATION)
Status: DISCONTINUED | OUTPATIENT
Start: 2025-02-19 | End: 2025-02-19

## 2025-02-19 RX ADMIN — ALBUTEROL SULFATE 2.5 MG: 0.83 SOLUTION RESPIRATORY (INHALATION) at 11:02

## 2025-02-19 NOTE — PROGRESS NOTES
"Subjective:       Patient ID: Rakan Castro is a 45 y.o. female.    Chief Complaint: Cough (Mucus producing Cough and hoarseness started over a week ago/ went to UC  over the and was given cough medications/ mild chills, runny nose, shortness of breath )    HPI  44 y/o female with BRITTON, Factor V Leiden carrier, hx of Covid is here with cough.     She started feeling bad a week ago, she has chest congestion, chest tightness, some sob, sore throat, pnd, ear pressure, voice hoarseness, cough with associated spasm and an episode of vomiting food and bile, she denies f, she has some nausea, she denies sinus pressure/d/constipation/abdominal pain, she feels like her heart is beating faster than normal, she denies urinary sx. Appetite is decreased. No known sick contacts. She was seen in  on 2/16 and had steroid shot, tessalon and promethazine DM and she feels like she is staying the same. Sleeping ok with Promethazine DM.      Hx of Covid 2020, 2021  Hx of superficial thrombophlebitis lower R leg, Factor V leiden carrier  BRITTON: on medication in college, took Paxil but was not helpful, taking xanax mainly for travel  Derm following with Dr. Mccracken; topical retinol  ENT Dr. Luna Patricia, gets otitis externa  Colon cancer screening due, has Cologuard at home, needs to send it in  GYN: following with Dr. Schofield, pelvic utd, mmg 4/2024  Eye exam utd  Dental utd       Review of Systems    Objective:      /76 (BP Location: Left arm, Patient Position: Sitting)   Pulse 88   Temp 98.3 °F (36.8 °C) (Oral)   Ht 5' 4" (1.626 m)   Wt 59.8 kg (131 lb 13.4 oz)   LMP 02/01/2025 (Exact Date)   SpO2 98%   BMI 22.63 kg/m²   Physical Exam  Vitals and nursing note reviewed.   Constitutional:       Appearance: She is well-developed.   HENT:      Head: Normocephalic and atraumatic.      Right Ear: Tympanic membrane normal.      Left Ear: Tympanic membrane normal.      Mouth/Throat:      Pharynx: No oropharyngeal " exudate or posterior oropharyngeal erythema.   Neck:      Thyroid: No thyromegaly.   Cardiovascular:      Rate and Rhythm: Normal rate and regular rhythm.      Heart sounds: Normal heart sounds.   Pulmonary:      Effort: Pulmonary effort is normal. No respiratory distress.      Breath sounds: Normal breath sounds.   Musculoskeletal:      Cervical back: Normal range of motion and neck supple.      Right lower leg: No edema.      Left lower leg: No edema.   Lymphadenopathy:      Cervical: No cervical adenopathy.   Skin:     General: Skin is warm and dry.   Neurological:      Mental Status: She is alert.         Assessment:       1. Bronchitis    2. Acute cough    3. Annual physical exam    4. Encounter for lipid screening for cardiovascular disease    5. Screening mammogram for breast cancer        Plan:   Rakan was seen today for cough.    Diagnoses and all orders for this visit:    Bronchitis  -     amoxicillin-clavulanate 875-125mg (AUGMENTIN) 875-125 mg per tablet; Take 1 tablet by mouth every 12 (twelve) hours. for 10 days  -     predniSONE (DELTASONE) 20 MG tablet; Take 2 tablets (40 mg total) by mouth once daily. for 3 days  -     albuterol (VENTOLIN HFA) 90 mcg/actuation inhaler; Inhale 2 puffs into the lungs every 6 (six) hours as needed for Wheezing. Rescue    Acute cough  -     POCT COVID-19 Rapid Screening  -     POCT Influenza A/B Molecular  -     Discontinue: albuterol nebulizer solution 0.63 mg  -     albuterol nebulizer solution 2.5 mg    Annual physical exam  -     CBC Auto Differential; Future  -     Comprehensive Metabolic Panel; Future  -     Hemoglobin A1C; Future  -     Lipid Panel; Future  -     TSH; Future    Encounter for lipid screening for cardiovascular disease  -     Lipid Panel; Future    Screening mammogram for breast cancer  -     Mammo Digital Screening Bilat w/ Vega (XPD); Future    She felt like she could take a deeper breath after albuterol treatment  Discussed CXR if not doing  better in the next 3-4 days or if she is getting worse

## 2025-04-15 ENCOUNTER — PATIENT MESSAGE (OUTPATIENT)
Dept: PRIMARY CARE CLINIC | Facility: CLINIC | Age: 45
End: 2025-04-15
Payer: COMMERCIAL

## 2025-04-16 ENCOUNTER — OFFICE VISIT (OUTPATIENT)
Dept: PRIMARY CARE CLINIC | Facility: CLINIC | Age: 45
End: 2025-04-16
Payer: COMMERCIAL

## 2025-04-16 ENCOUNTER — TELEPHONE (OUTPATIENT)
Dept: CARDIOTHORACIC SURGERY | Facility: CLINIC | Age: 45
End: 2025-04-16
Payer: COMMERCIAL

## 2025-04-16 DIAGNOSIS — D68.51 HETEROZYGOUS FACTOR V LEIDEN MUTATION: ICD-10-CM

## 2025-04-16 DIAGNOSIS — I80.01 THROMBOPHLEBITIS OF SUPERFICIAL VEINS OF RIGHT LOWER EXTREMITY: Primary | ICD-10-CM

## 2025-04-16 NOTE — PROGRESS NOTES
[facility] Progress Note      Audiovisual Visit  Location:  [place], [place]      Chief Complaint     No chief complaint on file.       Subjective:        Hx Right calf non-occlusive  superficial thrombosis last summer with neg venous US; but +Heterozygous Factor V leiden but was on OCP at the time; no tobacco;   -now feels discomfort in same right calf vein with swelling but less painful; pt is flying to [place] so wanted advice on treatment; able to walk and do all ADLs without discomfort     Past Medical History:  Past Medical History:   Diagnosis Date    Anxiety     xanax prn flying    History of pneumonia as a child     hospitalization    Thrombocytopenia        Past Surgical History:  Past Surgical History:   Procedure Laterality Date     SECTION      x 3       Allergies:  Review of patient's allergies indicates:   Allergen Reactions    Bactrim [sulfamethoxazole-trimethoprim] Hives       Home Medications:  Current Medications[1]     Family History:  Family History   Problem Relation Name Age of Onset    No Known Problems Mother      No Known Problems Father      No Known Problems Brother      No Known Problems Son      Breast cancer Maternal Aunt      Cancer Maternal Aunt          breast- postmenopausal    Cancer Maternal Grandmother          mets to bone    Heart disease Maternal Grandfather      Stroke Paternal Grandfather      Diabetes Neg Hx      Colon cancer Neg Hx      Hypertension Neg Hx      Hyperlipidemia Neg Hx         Social History:  Social History[2]    Review of Systems:  Pertinent positives and negatives listed in HPI. All other systems are reviewed and are negative.    Health Maintaince :   Health Maintenance Topics with due status: Not Due       Topic Last Completion Date    Mammogram 2024    Lipid Panel 10/16/2024    RSV Vaccine (Age 60+ and Pregnant patients) Not Due           Eye:   Dental:     Immunizations:     The 10-year ASCVD risk score (Cyril  OG, et al., 2019) is: 0.4%    Values used to calculate the score:      Age: 45 years      Sex: Female      Is Non- : No      Diabetic: No      Tobacco smoker: No      Systolic Blood Pressure: 118 mmHg      Is BP treated: No      HDL Cholesterol: 76 mg/dL      Total Cholesterol: 185 mg/dL      Objective:   There were no vitals taken for this visit.     There is no height or weight on file to calculate BMI.       Physical Examination:  General: Alert and awake in no apparent distress  Extrem: Warm and well-perfused with no clubbing, cyanosis or edema; dilated right medial superficial calf venous cord without overlying erythema   Neuro:  AAOx3; cooperative and pleasant with no focal deficits    Laboratory:      Most Recent Data:  Lab Results   Component Value Date    WBC 4.82 07/11/2024    HGB 13.5 07/11/2024    HCT 40.4 07/11/2024     07/11/2024    CHOL 185 10/16/2024    TRIG 45 10/16/2024    HDL 76 (H) 10/16/2024    ALT 14 07/11/2024    AST 23 07/11/2024     07/11/2024    K 4.0 07/11/2024     07/11/2024    BUN 10 07/11/2024    CO2 28 07/11/2024    TSH 0.797 10/16/2024    HGBA1C 4.7 10/16/2024              CBC:   WBC   Date Value Ref Range Status   07/11/2024 4.82 3.90 - 12.70 K/uL Final     Hemoglobin   Date Value Ref Range Status   07/11/2024 13.5 12.0 - 16.0 g/dL Final     Hematocrit   Date Value Ref Range Status   07/11/2024 40.4 37.0 - 48.5 % Final     Platelets   Date Value Ref Range Status   07/11/2024 174 150 - 450 K/uL Final     MCV   Date Value Ref Range Status   07/11/2024 91 82 - 98 fL Final     RDW   Date Value Ref Range Status   07/11/2024 12.5 11.5 - 14.5 % Final     BMP:   Sodium   Date Value Ref Range Status   07/11/2024 139 136 - 145 mmol/L Final     Potassium   Date Value Ref Range Status   07/11/2024 4.0 3.5 - 5.1 mmol/L Final     Chloride   Date Value Ref Range Status   07/11/2024 104 95 - 110 mmol/L Final     CO2   Date Value Ref Range Status  "  07/11/2024 28 23 - 29 mmol/L Final     BUN   Date Value Ref Range Status   07/11/2024 10 6 - 20 mg/dL Final     Creatinine   Date Value Ref Range Status   07/11/2024 0.8 0.5 - 1.4 mg/dL Final     Glucose   Date Value Ref Range Status   07/11/2024 77 70 - 110 mg/dL Final     Calcium   Date Value Ref Range Status   07/11/2024 9.2 8.7 - 10.5 mg/dL Final     LFTs:   Total Protein   Date Value Ref Range Status   07/11/2024 6.8 6.0 - 8.4 g/dL Final     Albumin   Date Value Ref Range Status   07/11/2024 3.9 3.5 - 5.2 g/dL Final     Total Bilirubin   Date Value Ref Range Status   07/11/2024 0.5 0.1 - 1.0 mg/dL Final     Comment:     For infants and newborns, interpretation of results should be based  on gestational age, weight and in agreement with clinical  observations.    Premature Infant recommended reference ranges:  Up to 24 hours.............<8.0 mg/dL  Up to 48 hours............<12.0 mg/dL  3-5 days..................<15.0 mg/dL  6-29 days.................<15.0 mg/dL       AST   Date Value Ref Range Status   07/11/2024 23 10 - 40 U/L Final     Alkaline Phosphatase   Date Value Ref Range Status   07/11/2024 45 (L) 55 - 135 U/L Final     ALT   Date Value Ref Range Status   07/11/2024 14 10 - 44 U/L Final     Coags: No results found for: "INR", "PROTIME", "PTT"  FLP:      Lab Results   Component Value Date    CHOL 185 10/16/2024    CHOL 175 02/03/2021    CHOL 171 10/04/2019     Lab Results   Component Value Date    HDL 76 (H) 10/16/2024    HDL 77 (H) 02/03/2021    HDL 76 (H) 10/04/2019     Lab Results   Component Value Date    LDLCALC 100.0 10/16/2024    LDLCALC 88.6 02/03/2021    LDLCALC 85.0 10/04/2019     Lab Results   Component Value Date    TRIG 45 10/16/2024    TRIG 47 02/03/2021    TRIG 50 10/04/2019     Lab Results   Component Value Date    CHOLHDL 41.1 10/16/2024    CHOLHDL 44.0 02/03/2021    CHOLHDL 44.4 10/04/2019      DM:      Hemoglobin A1C   Date Value Ref Range Status   10/16/2024 4.7 4.0 - 5.6 % Final " "    Comment:     ADA Screening Guidelines:  5.7-6.4%  Consistent with prediabetes  >or=6.5%  Consistent with diabetes    High levels of fetal hemoglobin interfere with the HbA1C  assay. Heterozygous hemoglobin variants (HbS, HgC, etc)do  not significantly interfere with this assay.   However, presence of multiple variants may affect accuracy.     10/04/2019 4.7 4.0 - 5.6 % Final     Comment:     ADA Screening Guidelines:  5.7-6.4%  Consistent with prediabetes  >or=6.5%  Consistent with diabetes  High levels of fetal hemoglobin interfere with the HbA1C  assay. Heterozygous hemoglobin variants (HbS, HgC, etc)do  not significantly interfere with this assay.   However, presence of multiple variants may affect accuracy.       LDL Cholesterol   Date Value Ref Range Status   10/16/2024 100.0 63.0 - 159.0 mg/dL Final     Comment:     The National Cholesterol Education Program (NCEP) has set the  following guidelines (reference values) for LDL Cholesterol:  Optimal.......................<130 mg/dL  Borderline High...............130-159 mg/dL  High..........................160-189 mg/dL  Very High.....................>190 mg/dL       Creatinine   Date Value Ref Range Status   07/11/2024 0.8 0.5 - 1.4 mg/dL Final     Thyroid:   TSH   Date Value Ref Range Status   10/16/2024 0.797 0.400 - 4.000 uIU/mL Final     Free T4   Date Value Ref Range Status   04/03/2019 0.88 0.71 - 1.51 ng/dL Final     Anemia: No results found for: "IRON", "TIBC", "FERRITIN", "OPCSTAYQ08", "FOLATE"  Cardiac: No results found for: "TROPONINI", "CKTOTAL", "CKMB", "BNP"  Urinalysis:   Color, UA   Date Value Ref Range Status   10/04/2019 Yellow Yellow, Straw, Katrina Final     Specific Gravity, UA   Date Value Ref Range Status   10/04/2019 1.015 1.005 - 1.030 Final     Nitrite, UA   Date Value Ref Range Status   10/04/2019 Negative Negative Final     Ketones, UA   Date Value Ref Range Status   10/04/2019 Trace (A) Negative Final     Urobilinogen, UA   Date " Value Ref Range Status   10/04/2019 Negative <2.0 EU/dL Final     WBC, UA   Date Value Ref Range Status   10/04/2019 2 0 - 5 /hpf Final       Other Results:  EKG (my interpretation):     Radiology:  Mammo Digital Screening Bilat w/ Vega  Narrative: Result:  Mammo Digital Screening Bilat w/ Vega    History:  Patient is 44 y.o. and is seen for a screening mammogram.    Films Compared:  Compared to: 03/31/2023 Mammo Digital Screening Bilat w/ Vega, 02/24/2021   Mammo Previous, 02/01/2021 Mammo Previous, 06/18/2019 Mammo Previous, and   05/16/2018 Mammo Previous     Findings:  This procedure was performed using tomosynthesis.   Computer-aided detection was utilized in the interpretation of this   examination.    The breasts are heterogeneously dense, which may obscure small masses.   There is no evidence of suspicious masses, microcalcifications or   architectural distortion.  Impression:    No mammographic evidence of malignancy.    BI-RADS Category 1: Negative    Recommendation:  Routine screening mammogram in 1 year is recommended.    Your estimated lifetime risk of breast cancer (to age 85) based on   Tyrer-Cuzick risk assessment model is 11.92%.  According to the American   Cancer Society, patients with a lifetime breast cancer risk of 20% or   higher might benefit from supplemental screening tests, such as screening   breast MRI.          Assessment/Plan     Rakan Castro is a 45 y.o. female with:  1. Thrombophlebitis of superficial veins of right lower extremity  Assessment & Plan:  Take NSAID of choice with food ie Aleve daily or consider ASA 81 mg daily; warm compresses qid x 20 min, elevate, and wear compression socks, stay active with walking and dorsi-and plantar-flexing foot luis on plane  Hydrate with 6-8 glasses of water daily  Refer to vascular for vein stripping      Orders:  -     Ambulatory referral/consult to Vascular Surgery; Future; Expected date: 04/16/2025    2. Heterozygous factor V Leiden  mutation  Assessment & Plan:  Avoid HRT, smoking, sedentary behavior     Consider prophylactic anticoagulation perioperatively for higher-risk procedures. See below from [employer]:     Procedure-related - Many procedure-related factors contribute to the risk of VTE in nonorthopedic patients including the extent and duration of surgery, intraoperative positioning, the type of anesthesia, and postoperative mobility. In general, the highest risk is in those undergoing major surgery (defined as surgery lasting longer than 45 minutes [13]), abdominal and thoracic cavity surgery (eg, major abdominal/pelvic or surgery for malignancy), prolonged surgery (>=2 hours), emergency rather than elective surgery, postoperative immobilization for >=4 days, as well as critically ill patients who are confined to bed (eg, extensive burns, multiple trauma, brain/spine injury) [13-16]. The risk is generally low for patients undergoing minor, typically ambulatory procedures (eg, elective hernia repair, thyroid surgery, minor skin excision, carotid endarterectomy).              Electronically signed by Jasbir Cox MD at 8/16/2024  5:10 PM               This includes face to face time and non-face to face time preparing to see the patient (eg, review of tests), obtaining and/or reviewing separately obtained history, documenting clinical information in the electronic or other health record, independently interpreting results and communicating results to the patient/family/caregiver, or care coordinator.   Code Status:     Maureen Charles MD          [1]   Current Outpatient Medications   Medication Sig Dispense Refill    ALAHIST PE 2-7.5 mg Tab Take 1 tablet by mouth every 6 (six) hours as needed.      albuterol (VENTOLIN HFA) 90 mcg/actuation inhaler Inhale 2 puffs into the lungs every 6 (six) hours as needed for Wheezing. Rescue 18 g 0    ALPRAZolam (XANAX) 1 MG tablet Take 1 tablet (1 mg total) by mouth daily as needed for  Anxiety. (Patient not taking: Reported on 2/19/2025) 8 tablet 0    ALTRENO 0.05 % Lotn Apply topically.      benzonatate (TESSALON) 100 MG capsule Take by mouth.      fluocinolone acetonide oiL 0.01 % Drop INSTILL 2 DROPS TO AFFECTED EAR TWICE DAILY AS NEEDED FOR ITCHING OR ECZEMA      fluticasone propionate (FLONASE) 50 mcg/actuation nasal spray 1 spray by Each Nostril route. (Patient not taking: Reported on 2/19/2025)      multivitamin capsule Take 1 capsule by mouth once daily.      promethazine-dextromethorphan (PROMETHAZINE-DM) 6.25-15 mg/5 mL Syrp TAKE 5 ML BY MOUTH FOUR TIMES DAILY FOR UP TO 7 DAYS AS NEEDED FOR COUGH       No current facility-administered medications for this visit.   [2]   Social History  Tobacco Use    Smoking status: Never    Smokeless tobacco: Never   Substance Use Topics    Alcohol use: Yes     Comment: socially    Drug use: No

## 2025-04-16 NOTE — ASSESSMENT & PLAN NOTE
Avoid HRT, smoking, sedentary behavior     Consider prophylactic anticoagulation perioperatively for higher-risk procedures. See below from [employer]:     Procedure-related - Many procedure-related factors contribute to the risk of VTE in nonorthopedic patients including the extent and duration of surgery, intraoperative positioning, the type of anesthesia, and postoperative mobility. In general, the highest risk is in those undergoing major surgery (defined as surgery lasting longer than 45 minutes [13]), abdominal and thoracic cavity surgery (eg, major abdominal/pelvic or surgery for malignancy), prolonged surgery (>=2 hours), emergency rather than elective surgery, postoperative immobilization for >=4 days, as well as critically ill patients who are confined to bed (eg, extensive burns, multiple trauma, brain/spine injury) [13-16]. The risk is generally low for patients undergoing minor, typically ambulatory procedures (eg, elective hernia repair, thyroid surgery, minor skin excision, carotid endarterectomy).              Electronically signed by Jasbir Cox MD at 8/16/2024  5:10 PM

## 2025-04-16 NOTE — TELEPHONE ENCOUNTER
----- Message from Med Assistant Gifford sent at 4/16/2025  9:24 AM CDT -----  Regarding: referral  Patient has a referral for- Thrombophlebitis of superficial veins of right lower extremity [I80.01],  please call to schedule.Thanks

## 2025-04-16 NOTE — TELEPHONE ENCOUNTER
Patient scheduled with Dr. Sheriff.    Future Appointments   Date Time Provider Department Center   4/21/2025  9:40 AM Norbert Sheriff MD PhD [facility] [place]   4/29/2025 11:30 AM [facility] [place] [facility]   7/9/2025  9:30 AM Michael Schofield MD [facility] [place]

## 2025-04-16 NOTE — ASSESSMENT & PLAN NOTE
Take NSAID of choice with food ie Aleve daily or consider ASA 81 mg daily; warm compresses qid x 20 min, elevate, and wear compression socks, stay active with walking and dorsi-and plantar-flexing foot luis on plane  Hydrate with 6-8 glasses of water daily  Refer to vascular for vein stripping

## 2025-04-29 ENCOUNTER — HOSPITAL ENCOUNTER (OUTPATIENT)
Dept: RADIOLOGY | Facility: HOSPITAL | Age: 45
Discharge: HOME | End: 2025-04-29
Attending: FAMILY MEDICINE
Payer: COMMERCIAL

## 2025-04-29 VITALS — WEIGHT: 131 LBS | HEIGHT: 64 IN | BODY MASS INDEX: 22.36 KG/M2

## 2025-04-29 DIAGNOSIS — Z12.31 SCREENING MAMMOGRAM FOR BREAST CANCER: ICD-10-CM

## 2025-04-29 PROCEDURE — 77063 BREAST TOMOSYNTHESIS BI: CPT | Mod: TC

## 2025-04-29 PROCEDURE — 77063 BREAST TOMOSYNTHESIS BI: CPT | Mod: 26,,, | Performed by: RADIOLOGY

## 2025-04-29 PROCEDURE — 77067 SCR MAMMO BI INCL CAD: CPT | Mod: 26,,, | Performed by: RADIOLOGY

## 2025-04-30 ENCOUNTER — RESULTS FOLLOW-UP (OUTPATIENT)
Dept: PRIMARY CARE CLINIC | Facility: CLINIC | Age: 45
End: 2025-04-30

## 2025-06-23 ENCOUNTER — CLINICAL SUPPORT (OUTPATIENT)
Dept: ENDOSCOPY | Facility: HOSPITAL | Age: 45
End: 2025-06-23
Attending: FAMILY MEDICINE
Payer: COMMERCIAL

## 2025-06-23 VITALS — WEIGHT: 126 LBS | HEIGHT: 64 IN | BODY MASS INDEX: 21.51 KG/M2

## 2025-06-23 DIAGNOSIS — Z12.11 SPECIAL SCREENING FOR MALIGNANT NEOPLASMS, COLON: Primary | ICD-10-CM

## 2025-06-23 DIAGNOSIS — Z12.11 SCREEN FOR COLON CANCER: ICD-10-CM

## 2025-06-23 RX ORDER — SOD SULF/POT CHLORIDE/MAG SULF 1.479 G
12 TABLET ORAL DAILY
Qty: 24 TABLET | Refills: 0 | Status: SHIPPED | OUTPATIENT
Start: 2025-08-20

## 2025-06-23 NOTE — PATIENT INSTRUCTIONS
Colonoscopy Procedure Prep Instructions    Date of procedure: 08/27/2025 Arrive at: 07:00AM    Location of Department:   [facility] [address] [id number]  Take the Elevators to 2nd Floor Endoscopy Procedural Area    As soon as possible:   your prep from the pharmacy          On the day before your procedure   What NOT to do:   Do not EAT solid food, drink milk or anything   colored red.  Do not drink alcohol.  Do not take other laxatives while taking SUTAB.  Do not take oral medications within 1 hour of starting   each dose of SUTAB.  No gum chewing or candy morning of procedure  If taking tetracycline or fluoroquinolone antibiotics, iron, digoxin, chlorpromazine, or penicillamine, take these medications at least 2 hours before and not less than 6 hours after administration of each dose of SUTAB.    DO:   Drink clear liquids ONLY - Drink at least 6 to 8 glasses of clear liquids from time you wake up until you begin your prep to avoid dehydration.     Liquids That Are OK to Drink:   Water  Sports drinks (Gatorade, Power-Aid)  Coffee or tea (no cream or nondairy creamer)  Clear juices without pulp (apple, white grape)  Gelatin desserts (no fruit or toppings)  Clear soda (sprite, coke, ginger ale)  Chicken broth (until 12 midnight the night before procedure)    (Please disregard the insert instructions from pharmacy and follow these instructions).    Note:   SUTAB is an osmotic laxative indicated for cleansing of the colon in preparation for colonoscopy in adults.  Medication taken by mouth may not be absorbed properly when taken within 1 hour before the start of each dose of   SUTAB.    IMPORTANT: If you experience preparation-related symptoms (for example, nausea, bloating, or cramping), pause, or slow the rate of drinking the additional water until your symptoms decrease.    How to take prep:    SUTAB is a (2-day) prep. A total of  24 tablets are required for  complete preparation for   colonoscopy.     DOSE 1--Day Before Colonoscopy 08/26/2025  at 5:00 pm    STEP 1 Open 1 bottle of 12 tablets.    STEP 2 Fill the provided container with 16 ounces of water (up to the fill line). Swallow 1 tablet every 1 to 2 minutes.  You should finish the 12 tablets and the entire 16 ounces of water within 20 minutes.    STEP 3 After 1 hour of completing step 2, drink at least   32 ounces of additional water/clear liquids until bedtime, but more is better.     DOSE 2--Day of the Colonoscopy 08/27/2025 at 12 AM (midnight)-3 AM.    STEP 1 Open 1 bottle of 12 tablets.    STEP 2 Fill the provided container with 16 ounces of water (up to the fill line). Swallow 1 tablet every 1 to 2 minutes.  You should finish the 12 tablets and the entire 16 ounces of water within 20 minutes.    STEP 3 After 1 hour of completing step 2, drink at least 32 ounces of additional water/clear liquids until 4 hours before your colonoscopy then nothing else by mouth or as directed by the scheduling nurse 04:00AM .      For information about your procedure, two (2) things to view prior to colonoscopy:  Please watch this informational video. It is important to watch this animated consent video prior to your arrival. If you haven't watched the video prior to arriving, you are required to watch it during admission which can causes delays.    Options for viewing:   Using a keyboard:  press and hold the control tab (Ctrl) and left mouse click to follow links.           Colonoscopy Instructional Video                                                                                   OR    Type link address into your web browser's address bar:  [email or web address]      Educational Booklet with pictures:      Colonoscopy Prep - Tablet     Comments:             IMPORTANT INFORMATION TO KNOW BEFORE YOUR PROCEDURE    [facility] 2nd Floor         If your procedure requires the  administration of anesthesia, it is necessary for a responsible adult to drive you home. (Medical Transportation, Uber, Lyft, Taxi, etc. may ONLY be used if a responsible adult is present to accompany you home.  The responsible adult CAN'T be the  of the service).      person must be available to return to pick you up within 15 minutes of being notified of discharge.       Please bring a picture ID, insurance card, & copayment      Take Medications as directed below:      If you begin taking any blood thinning medications, injectable weight loss/diabetes medications (other than insulin) , Adipex (Phentermine) , please contact the endoscopy scheduling department listed below as soon as possible.    If you are diabetic see the attached instruction sheet regarding your medication.     If you take HEART, BLOOD PRESSURE, SEIZURE, PAIN, LUNG (including inhalers/nebulizers), ANTI-REJECTION (transplant patients), or PSYCHIATRIC medications, please take at your regular times with a sip of water or as directed by the scheduling nurse.     Important contact information:    Endoscopy [phone] Hours of operation Monday-Friday 8:00-4:30pm.    Questions about insurance or financial obligations call [phone] or [phone].    If you have questions regarding the prep or need to reschedule, please call [phone]. After hours questions requiring immediate assistance, contact [facility] nurse line at [phone] or [phone].   NOTE:     On occasion, unforeseen circumstances may cause a delay in your procedure start time. We respect your time and appreciate your patience during these circumstances.      Comments:

## 2025-06-23 NOTE — PROGRESS NOTES
Patient is scheduled for a Colonoscopy on 08/27/2025 with Dr. ROMELIA Martell  Referral for procedure from PAT appointment

## 2025-07-09 ENCOUNTER — OFFICE VISIT (OUTPATIENT)
Dept: OBSTETRICS AND GYNECOLOGY | Facility: CLINIC | Age: 45
End: 2025-07-09
Payer: COMMERCIAL

## 2025-07-09 VITALS — BODY MASS INDEX: 21.83 KG/M2 | WEIGHT: 127.88 LBS | HEIGHT: 64 IN

## 2025-07-09 DIAGNOSIS — Z12.4 SCREENING FOR CERVICAL CANCER: ICD-10-CM

## 2025-07-09 DIAGNOSIS — Z11.51 SCREENING FOR HPV (HUMAN PAPILLOMAVIRUS): ICD-10-CM

## 2025-07-09 DIAGNOSIS — Z01.419 ENCOUNTER FOR GYNECOLOGICAL EXAMINATION: Primary | ICD-10-CM

## 2025-07-09 PROCEDURE — 99999 PR PBB SHADOW E&M-EST. PATIENT-LVL III: CPT | Mod: PBBFAC,,, | Performed by: OBSTETRICS & GYNECOLOGY

## 2025-07-09 NOTE — PROGRESS NOTES
LMP: Patient's last menstrual period was 2025 (exact date)..    Contraception: The current method of family planning is vasectomy  Meds per MD: none    Last Pap:  pap & hpv negative  Last MM2025 birads 1c, T-C 12%  Last Colonoscopy: sched

## 2025-07-09 NOTE — PROGRESS NOTES
Chief Complaint: well woman exam  Annual Exam    She is established    Rakan Castro is a 45 y.o. female  presents for a well woman exam.    Hx  Right calf non-occlusive superficial thrombophlebitis  last summer after traveling.with neg venous US; but +Heterozygous Factor V leiden but was on OCP at the time Heterozygous for factor 5 Leiden and protein C is low.    On NO OCP now   Saw Hematology with no further recommendations or interventions.   Reports any elective surgical procedures in the future as she likely ought to have prophylactic anticoagulation perioperatively for higher-risk procedures.   I do rec her to see vein specialist referred by Pcp as still having issues with varicose veins    ROS:  No abdominal pain. No vaginal discharge  No breast pain or masses, No rectal bleeding       Cycles:  regular and monthly does have brown mucus mid cycle    LMP: Patient's last menstrual period was 2025 (exact date)..    Contraception: The current method of family planning is vasectomy  Meds per MD: none     Last Pap:  pap & hpv negative  Last MM2025 birads 1c, T-C 12%  Last Colonoscopy: sched          Past Medical History:   Diagnosis Date    Anxiety     xanax prn flying    History of pneumonia as a child     hospitalization    Thrombocytopenia        Past Surgical History:   Procedure Laterality Date     SECTION      x 3       OB History    Para Term  AB Living   3 3 3   3   SAB IAB Ectopic Multiple Live Births       3      # Outcome Date GA Lbr Curry/2nd Weight Sex Type Anes PTL Lv   3 Term  38w0d  2.892 kg (6 lb 6 oz) M CS-LTranv   SONIA   2 Term  38w0d  2.58 kg (5 lb 11 oz) M CS-LTranv   SONIA   1 Term  39w0d  2.466 kg (5 lb 7 oz) M CS-LTranv   SONIA      Complications: Oligohydramnios       Family History   Problem Relation Name Age of Onset    Stroke Paternal Grandfather      Cancer Maternal Grandmother          mets to bone    Heart disease  "Maternal Grandfather      No Known Problems Father      No Known Problems Mother      No Known Problems Brother      No Known Problems Son      Breast cancer Maternal Aunt      Cancer Maternal Aunt          breast- postmenopausal    Diabetes Neg Hx      Colon cancer Neg Hx      Hypertension Neg Hx      Hyperlipidemia Neg Hx         Social History[1]        Physical Exam:  Ht 5' 4" (1.626 m)   Wt 58 kg (127 lb 13.9 oz)   LMP 06/29/2025 (Exact Date)   BMI 21.95 kg/m²     APPEARANCE: Well nourished, well developed, in no acute distress.  AFFECT: WNL, alert and oriented x 3  SKIN: No acne or hirsutism  BREASTS: Symmetrical, no skin changes.                      No nipple discharge.   No palpable masses bilaterally  NODES: No inguinal nor axillary LAD  ABDOMEN: soft Non tender Non distended No masses  PELVIC: Female chaperone was present in the room during pelvic exam.  Normal external genitalia without lesions.  Normal hair distribution.   Adequate perineal body, normal urethral meatus.   No signif cystocele or rectocele.  Vagina moist and well rugated without lesions or discharge.    Cervix pink, without lesions, discharge or tenderness.     PAP performed   Bimanual exam shows uterus to be normal size, regular, mobile and nontender.    Adnexa without masses or tenderness.    EXTREMITIES: No edema.   Does have superficial varicosities in her lower extremities        ASSESSMENT AND PLAN  Rakan was seen today for annual exam.    Diagnoses and all orders for this visit:    Encounter for gynecological examination      Encounter for well woman exam with routine gynecological exam      -PAP  today  -normal exam   -MMG UTD next due  April 2026          -  BP normotensive                            Patient was counseled today on A.C.S. Pap guidelines and recommendations for yearly pelvic exams, mammograms and monthly self breast exams; to see her PCP for other health maintenance.     Patient encouraged to register for " portal and results will be sent via portal.       Health Maintenance   Topic Date Due    Hepatitis C Screening  Never done    TETANUS VACCINE  Never done    Colorectal Cancer Screening  Never done    COVID-19 Vaccine (3 - 2024-25 season) 09/01/2024    Cervical Cancer Screening  11/04/2024    HIV Screening  10/16/2030 (Originally 2/13/1995)    Mammogram  04/29/2027    Lipid Panel  10/16/2029    RSV Vaccine (Age 60+ and Pregnant patients) (1 - 1-dose 75+ series) 02/13/2054    Pneumococcal Vaccines (Age 0-49)  Aged Out    Influenza Vaccine  Discontinued              [1]   Social History  Tobacco Use    Smoking status: Never    Smokeless tobacco: Never   Substance Use Topics    Alcohol use: Yes     Comment: socially    Drug use: No

## 2025-07-11 ENCOUNTER — TELEPHONE (OUTPATIENT)
Dept: OBSTETRICS AND GYNECOLOGY | Facility: CLINIC | Age: 45
End: 2025-07-11
Payer: COMMERCIAL

## 2025-07-11 ENCOUNTER — PATIENT MESSAGE (OUTPATIENT)
Dept: OBSTETRICS AND GYNECOLOGY | Facility: CLINIC | Age: 45
End: 2025-07-11
Payer: COMMERCIAL

## 2025-07-11 RX ORDER — SCOPOLAMINE 1 MG/3D
1 PATCH, EXTENDED RELEASE TRANSDERMAL
Qty: 4 PATCH | Refills: 0 | Status: SHIPPED | OUTPATIENT
Start: 2025-07-11 | End: 2026-07-11

## 2025-08-19 ENCOUNTER — ANESTHESIA EVENT (OUTPATIENT)
Dept: ENDOSCOPY | Facility: HOSPITAL | Age: 45
End: 2025-08-19
Payer: COMMERCIAL

## 2025-08-19 ENCOUNTER — TELEPHONE (OUTPATIENT)
Dept: ENDOSCOPY | Facility: HOSPITAL | Age: 45
End: 2025-08-19
Payer: COMMERCIAL

## 2025-08-25 ENCOUNTER — TELEPHONE (OUTPATIENT)
Dept: ENDOSCOPY | Facility: HOSPITAL | Age: 45
End: 2025-08-25
Payer: COMMERCIAL

## 2025-08-25 DIAGNOSIS — Z12.11 SPECIAL SCREENING FOR MALIGNANT NEOPLASMS, COLON: Primary | ICD-10-CM

## 2025-08-25 RX ORDER — SOD SULF/POT CHLORIDE/MAG SULF 1.479 G
12 TABLET ORAL DAILY
Qty: 24 TABLET | Refills: 0 | Status: SHIPPED | OUTPATIENT
Start: 2025-08-25

## 2025-08-27 ENCOUNTER — HOSPITAL ENCOUNTER (OUTPATIENT)
Facility: HOSPITAL | Age: 45
Discharge: HOME | End: 2025-08-27
Attending: INTERNAL MEDICINE | Admitting: INTERNAL MEDICINE
Payer: COMMERCIAL

## 2025-08-27 ENCOUNTER — ANESTHESIA (OUTPATIENT)
Dept: ENDOSCOPY | Facility: HOSPITAL | Age: 45
End: 2025-08-27
Payer: COMMERCIAL

## 2025-08-27 VITALS
SYSTOLIC BLOOD PRESSURE: 97 MMHG | BODY MASS INDEX: 21.17 KG/M2 | TEMPERATURE: 98 F | RESPIRATION RATE: 19 BRPM | DIASTOLIC BLOOD PRESSURE: 53 MMHG | OXYGEN SATURATION: 100 % | HEIGHT: 64 IN | WEIGHT: 124 LBS | HEART RATE: 74 BPM

## 2025-08-27 DIAGNOSIS — Z12.11 SCREEN FOR COLON CANCER: Primary | ICD-10-CM

## 2025-08-27 LAB
B-HCG UR QL: NEGATIVE
CTP QC/QA: YES

## 2025-08-27 PROCEDURE — 99900035 HC TECH TIME PER 15 MIN (STAT)

## 2025-08-27 PROCEDURE — 88305 TISSUE EXAM BY PATHOLOGIST: CPT | Mod: 26,,, | Performed by: PATHOLOGY

## 2025-08-27 PROCEDURE — 37000008 HC ANESTHESIA 1ST 15 MINUTES: Performed by: INTERNAL MEDICINE

## 2025-08-27 PROCEDURE — 88305 TISSUE EXAM BY PATHOLOGIST: CPT | Mod: TC | Performed by: PATHOLOGY

## 2025-08-27 PROCEDURE — 27201089 HC SNARE, DISP (ANY): Performed by: INTERNAL MEDICINE

## 2025-08-27 PROCEDURE — 37000009 HC ANESTHESIA EA ADD 15 MINS: Performed by: INTERNAL MEDICINE

## 2025-08-27 PROCEDURE — 63600175 PHARM REV CODE 636 W HCPCS: Performed by: NURSE ANESTHETIST, CERTIFIED REGISTERED

## 2025-08-27 PROCEDURE — 45385 COLONOSCOPY W/LESION REMOVAL: CPT | Mod: 33,,, | Performed by: INTERNAL MEDICINE

## 2025-08-27 PROCEDURE — 94761 N-INVAS EAR/PLS OXIMETRY MLT: CPT

## 2025-08-27 PROCEDURE — 45385 COLONOSCOPY W/LESION REMOVAL: CPT | Performed by: INTERNAL MEDICINE

## 2025-08-27 PROCEDURE — 25000003 PHARM REV CODE 250: Performed by: NURSE ANESTHETIST, CERTIFIED REGISTERED

## 2025-08-27 PROCEDURE — 81025 URINE PREGNANCY TEST: CPT | Performed by: INTERNAL MEDICINE

## 2025-08-27 RX ORDER — LIDOCAINE HYDROCHLORIDE 20 MG/ML
INJECTION INTRAVENOUS
Status: DISCONTINUED | OUTPATIENT
Start: 2025-08-27 | End: 2025-08-27

## 2025-08-27 RX ORDER — PROPOFOL 10 MG/ML
VIAL (ML) INTRAVENOUS
Status: DISCONTINUED | OUTPATIENT
Start: 2025-08-27 | End: 2025-08-27

## 2025-08-27 RX ORDER — SODIUM CHLORIDE 9 MG/ML
INJECTION, SOLUTION INTRAVENOUS CONTINUOUS
Status: DISCONTINUED | OUTPATIENT
Start: 2025-08-27 | End: 2025-08-27 | Stop reason: HOSPADM

## 2025-08-27 RX ORDER — PROPOFOL 10 MG/ML
VIAL (ML) INTRAVENOUS CONTINUOUS PRN
Status: DISCONTINUED | OUTPATIENT
Start: 2025-08-27 | End: 2025-08-27

## 2025-08-27 RX ADMIN — PROPOFOL 60 MG: 10 INJECTION, EMULSION INTRAVENOUS at 07:08

## 2025-08-27 RX ADMIN — PROPOFOL 100 MG: 10 INJECTION, EMULSION INTRAVENOUS at 07:08

## 2025-08-27 RX ADMIN — GLYCOPYRROLATE 0.1 MG: 0.2 INJECTION, SOLUTION INTRAMUSCULAR; INTRAVENOUS at 07:08

## 2025-08-27 RX ADMIN — PROPOFOL 200 MCG/KG/MIN: 10 INJECTION, EMULSION INTRAVENOUS at 07:08

## 2025-08-27 RX ADMIN — SODIUM CHLORIDE: 0.9 INJECTION, SOLUTION INTRAVENOUS at 07:08

## 2025-08-27 RX ADMIN — LIDOCAINE HYDROCHLORIDE 50 MG: 20 INJECTION INTRAVENOUS at 07:08

## 2025-08-28 LAB
ESTROGEN SERPL-MCNC: NORMAL PG/ML
INSULIN SERPL-ACNC: NORMAL U[IU]/ML
LAB AP CLINICAL INFORMATION: NORMAL
LAB AP GROSS DESCRIPTION: NORMAL
LAB AP PERFORMING LOCATION(S): NORMAL
LAB AP REPORT FOOTNOTES: NORMAL